# Patient Record
Sex: MALE | Race: WHITE | NOT HISPANIC OR LATINO | Employment: FULL TIME | URBAN - NONMETROPOLITAN AREA
[De-identification: names, ages, dates, MRNs, and addresses within clinical notes are randomized per-mention and may not be internally consistent; named-entity substitution may affect disease eponyms.]

---

## 2019-06-05 ENCOUNTER — HOSPITAL ENCOUNTER (EMERGENCY)
Facility: HOSPITAL | Age: 24
Discharge: PSYCHIATRIC OR SUBSTANCE USE FACILITY | End: 2019-06-06
Attending: EMERGENCY MEDICINE

## 2019-06-05 VITALS
HEART RATE: 110 BPM | DIASTOLIC BLOOD PRESSURE: 80 MMHG | OXYGEN SATURATION: 97 % | RESPIRATION RATE: 16 BRPM | HEIGHT: 65 IN | SYSTOLIC BLOOD PRESSURE: 135 MMHG | BODY MASS INDEX: 25.33 KG/M2 | TEMPERATURE: 98.1 F | WEIGHT: 152 LBS

## 2019-06-05 DIAGNOSIS — R31.9 HEMATURIA, UNSPECIFIED TYPE: ICD-10-CM

## 2019-06-05 DIAGNOSIS — R45.851 SUICIDAL IDEATION: Primary | ICD-10-CM

## 2019-06-06 ENCOUNTER — HOSPITAL ENCOUNTER (INPATIENT)
Facility: HOSPITAL | Age: 24
LOS: 4 days | Discharge: HOME | End: 2019-06-10
Attending: PSYCHIATRY & NEUROLOGY | Admitting: PSYCHIATRY & NEUROLOGY

## 2019-06-06 PROBLEM — R41.83 BORDERLINE INTELLECTUAL FUNCTIONING: Status: ACTIVE | Noted: 2019-06-06

## 2019-06-06 PROBLEM — F63.9 IMPULSE CONTROL DISORDER: Status: ACTIVE | Noted: 2019-06-06

## 2019-06-06 PROBLEM — F33.2 SEVERE EPISODE OF RECURRENT MAJOR DEPRESSIVE DISORDER, WITHOUT PSYCHOTIC FEATURES: Status: ACTIVE | Noted: 2019-06-06

## 2019-06-06 PROBLEM — R45.851 SUICIDAL IDEATION: Status: ACTIVE | Noted: 2019-06-06

## 2019-06-06 LAB
ALBUMIN SERPL-MCNC: 4.5 G/DL (ref 3.5–5.2)
ALBUMIN/GLOB SERPL: 1.3 G/DL
ALP SERPL-CCNC: 101 U/L (ref 39–117)
ALT SERPL W P-5'-P-CCNC: 25 U/L (ref 1–41)
AMPHET+METHAMPHET UR QL: NEGATIVE
ANION GAP SERPL CALCULATED.3IONS-SCNC: 12 MMOL/L
APAP SERPL-MCNC: <5 MCG/ML (ref 10–30)
AST SERPL-CCNC: 16 U/L (ref 1–40)
BACTERIA UR QL AUTO: ABNORMAL /HPF
BARBITURATES UR QL SCN: NEGATIVE
BASOPHILS # BLD AUTO: 0.05 10*3/MM3 (ref 0–0.2)
BASOPHILS NFR BLD AUTO: 0.4 % (ref 0–1.5)
BENZODIAZ UR QL SCN: NEGATIVE
BILIRUB SERPL-MCNC: 0.8 MG/DL (ref 0.2–1.2)
BILIRUB UR QL STRIP: ABNORMAL
BUN BLD-MCNC: 10 MG/DL (ref 6–20)
BUN/CREAT SERPL: 12.3 (ref 7–25)
CALCIUM SPEC-SCNC: 9.6 MG/DL (ref 8.6–10.5)
CANNABINOIDS SERPL QL: NEGATIVE
CHLORIDE SERPL-SCNC: 104 MMOL/L (ref 98–107)
CHOLEST SERPL-MCNC: 170 MG/DL (ref 0–200)
CLARITY UR: CLEAR
CO2 SERPL-SCNC: 27 MMOL/L (ref 22–29)
COCAINE UR QL: NEGATIVE
COLOR UR: YELLOW
CREAT BLD-MCNC: 0.81 MG/DL (ref 0.76–1.27)
DEPRECATED RDW RBC AUTO: 37.1 FL (ref 37–54)
EOSINOPHIL # BLD AUTO: 0.32 10*3/MM3 (ref 0–0.4)
EOSINOPHIL NFR BLD AUTO: 2.5 % (ref 0.3–6.2)
ERYTHROCYTE [DISTWIDTH] IN BLOOD BY AUTOMATED COUNT: 12.4 % (ref 12.3–15.4)
ETHANOL BLD-MCNC: <10 MG/DL (ref 0–10)
ETHANOL UR QL: <0.01 %
GFR SERPL CREATININE-BSD FRML MDRD: 119 ML/MIN/1.73
GLOBULIN UR ELPH-MCNC: 3.5 GM/DL
GLUCOSE BLD-MCNC: 113 MG/DL (ref 65–99)
GLUCOSE P FAST SERPL-MCNC: 99 MG/DL (ref 72–112)
GLUCOSE UR STRIP-MCNC: NEGATIVE MG/DL
HCT VFR BLD AUTO: 46.5 % (ref 37.5–51)
HDLC SERPL-MCNC: 36 MG/DL (ref 40–60)
HGB BLD-MCNC: 15.8 G/DL (ref 13–17.7)
HGB UR QL STRIP.AUTO: ABNORMAL
HOLD SPECIMEN: NORMAL
HOLD SPECIMEN: NORMAL
HYALINE CASTS UR QL AUTO: ABNORMAL /LPF
IMM GRANULOCYTES # BLD AUTO: 0.06 10*3/MM3 (ref 0–0.05)
IMM GRANULOCYTES NFR BLD AUTO: 0.5 % (ref 0–0.5)
KETONES UR QL STRIP: NEGATIVE
LDLC SERPL CALC-MCNC: 119 MG/DL (ref 0–100)
LDLC/HDLC SERPL: 3.32 {RATIO}
LEUKOCYTE ESTERASE UR QL STRIP.AUTO: NEGATIVE
LYMPHOCYTES # BLD AUTO: 4.83 10*3/MM3 (ref 0.7–3.1)
LYMPHOCYTES NFR BLD AUTO: 37.6 % (ref 19.6–45.3)
MCH RBC QN AUTO: 27.8 PG (ref 26.6–33)
MCHC RBC AUTO-ENTMCNC: 34 G/DL (ref 31.5–35.7)
MCV RBC AUTO: 81.9 FL (ref 79–97)
METHADONE UR QL SCN: NEGATIVE
MONOCYTES # BLD AUTO: 0.89 10*3/MM3 (ref 0.1–0.9)
MONOCYTES NFR BLD AUTO: 6.9 % (ref 5–12)
NEUTROPHILS # BLD AUTO: 6.68 10*3/MM3 (ref 1.7–7)
NEUTROPHILS NFR BLD AUTO: 52.1 % (ref 42.7–76)
NITRITE UR QL STRIP: NEGATIVE
NRBC BLD AUTO-RTO: 0 /100 WBC (ref 0–0.2)
OPIATES UR QL: NEGATIVE
OXYCODONE UR QL SCN: NEGATIVE
PH UR STRIP.AUTO: 7 [PH] (ref 5–9)
PLAT MORPH BLD: NORMAL
PLATELET # BLD AUTO: 184 10*3/MM3 (ref 140–450)
PMV BLD AUTO: 13 FL (ref 6–12)
POTASSIUM BLD-SCNC: 3.8 MMOL/L (ref 3.5–5.2)
PROT SERPL-MCNC: 8 G/DL (ref 6–8.5)
PROT UR QL STRIP: NEGATIVE
RBC # BLD AUTO: 5.68 10*6/MM3 (ref 4.14–5.8)
RBC # UR: ABNORMAL /HPF
RBC MORPH BLD: NORMAL
REF LAB TEST METHOD: ABNORMAL
SALICYLATES SERPL-MCNC: <0.3 MG/DL
SODIUM BLD-SCNC: 143 MMOL/L (ref 136–145)
SP GR UR STRIP: 1.02 (ref 1–1.03)
SQUAMOUS #/AREA URNS HPF: ABNORMAL /HPF
TRIGL SERPL-MCNC: 73 MG/DL (ref 0–150)
UROBILINOGEN UR QL STRIP: ABNORMAL
VLDLC SERPL-MCNC: 14.6 MG/DL
WBC MORPH BLD: NORMAL
WBC NRBC COR # BLD: 12.83 10*3/MM3 (ref 3.4–10.8)
WBC UR QL AUTO: ABNORMAL /HPF
WHOLE BLOOD HOLD SPECIMEN: NORMAL
WHOLE BLOOD HOLD SPECIMEN: NORMAL

## 2019-06-06 PROCEDURE — 80307 DRUG TEST PRSMV CHEM ANLYZR: CPT

## 2019-06-06 PROCEDURE — 93005 ELECTROCARDIOGRAM TRACING: CPT | Performed by: PSYCHIATRY & NEUROLOGY

## 2019-06-06 PROCEDURE — 82306 VITAMIN D 25 HYDROXY: CPT | Performed by: PSYCHIATRY & NEUROLOGY

## 2019-06-06 PROCEDURE — 90791 PSYCH DIAGNOSTIC EVALUATION: CPT | Performed by: PSYCHIATRY & NEUROLOGY

## 2019-06-06 PROCEDURE — 85007 BL SMEAR W/DIFF WBC COUNT: CPT | Performed by: EMERGENCY MEDICINE

## 2019-06-06 PROCEDURE — 82947 ASSAY GLUCOSE BLOOD QUANT: CPT | Performed by: PSYCHIATRY & NEUROLOGY

## 2019-06-06 PROCEDURE — 81001 URINALYSIS AUTO W/SCOPE: CPT

## 2019-06-06 PROCEDURE — 82607 VITAMIN B-12: CPT | Performed by: PSYCHIATRY & NEUROLOGY

## 2019-06-06 PROCEDURE — 80053 COMPREHEN METABOLIC PANEL: CPT

## 2019-06-06 PROCEDURE — 80061 LIPID PANEL: CPT | Performed by: PSYCHIATRY & NEUROLOGY

## 2019-06-06 PROCEDURE — 93010 ELECTROCARDIOGRAM REPORT: CPT | Performed by: INTERNAL MEDICINE

## 2019-06-06 PROCEDURE — 85025 COMPLETE CBC W/AUTO DIFF WBC: CPT

## 2019-06-06 PROCEDURE — 99222 1ST HOSP IP/OBS MODERATE 55: CPT | Performed by: FAMILY MEDICINE

## 2019-06-06 RX ORDER — LOPERAMIDE HYDROCHLORIDE 2 MG/1
2 CAPSULE ORAL
Status: CANCELLED | OUTPATIENT
Start: 2019-06-06

## 2019-06-06 RX ORDER — HYDROXYZINE PAMOATE 50 MG/1
50 CAPSULE ORAL EVERY 6 HOURS PRN
Status: DISCONTINUED | OUTPATIENT
Start: 2019-06-06 | End: 2019-06-10 | Stop reason: HOSPADM

## 2019-06-06 RX ORDER — TRAZODONE HYDROCHLORIDE 50 MG/1
50 TABLET ORAL NIGHTLY PRN
Status: CANCELLED | OUTPATIENT
Start: 2019-06-06

## 2019-06-06 RX ORDER — ACETAMINOPHEN 325 MG/1
650 TABLET ORAL EVERY 4 HOURS PRN
Status: CANCELLED | OUTPATIENT
Start: 2019-06-06

## 2019-06-06 RX ORDER — VENLAFAXINE HYDROCHLORIDE 75 MG/1
150 CAPSULE, EXTENDED RELEASE ORAL
Status: COMPLETED | OUTPATIENT
Start: 2019-06-09 | End: 2019-06-09

## 2019-06-06 RX ORDER — LOPERAMIDE HYDROCHLORIDE 2 MG/1
2 CAPSULE ORAL
Status: DISCONTINUED | OUTPATIENT
Start: 2019-06-06 | End: 2019-06-10 | Stop reason: HOSPADM

## 2019-06-06 RX ORDER — CLONIDINE HYDROCHLORIDE 0.1 MG/1
0.1 TABLET ORAL NIGHTLY
Status: DISCONTINUED | OUTPATIENT
Start: 2019-06-06 | End: 2019-06-08

## 2019-06-06 RX ORDER — HYDROXYZINE PAMOATE 25 MG/1
50 CAPSULE ORAL EVERY 6 HOURS PRN
Status: CANCELLED | OUTPATIENT
Start: 2019-06-06

## 2019-06-06 RX ORDER — CLONIDINE HYDROCHLORIDE 0.1 MG/1
0.1 TABLET ORAL EVERY 4 HOURS PRN
Status: DISCONTINUED | OUTPATIENT
Start: 2019-06-06 | End: 2019-06-10 | Stop reason: HOSPADM

## 2019-06-06 RX ORDER — TRAZODONE HYDROCHLORIDE 50 MG/1
50 TABLET ORAL NIGHTLY PRN
Status: DISCONTINUED | OUTPATIENT
Start: 2019-06-06 | End: 2019-06-10 | Stop reason: HOSPADM

## 2019-06-06 RX ORDER — CLONIDINE HYDROCHLORIDE 0.1 MG/1
0.1 TABLET ORAL EVERY 4 HOURS PRN
Status: CANCELLED | OUTPATIENT
Start: 2019-06-06

## 2019-06-06 RX ORDER — VENLAFAXINE HYDROCHLORIDE 75 MG/1
75 CAPSULE, EXTENDED RELEASE ORAL
Status: COMPLETED | OUTPATIENT
Start: 2019-06-08 | End: 2019-06-08

## 2019-06-06 RX ORDER — VENLAFAXINE HYDROCHLORIDE 37.5 MG/1
37.5 CAPSULE, EXTENDED RELEASE ORAL
Status: COMPLETED | OUTPATIENT
Start: 2019-06-07 | End: 2019-06-07

## 2019-06-06 RX ORDER — ALUMINA, MAGNESIA, AND SIMETHICONE 2400; 2400; 240 MG/30ML; MG/30ML; MG/30ML
15 SUSPENSION ORAL EVERY 6 HOURS PRN
Status: CANCELLED | OUTPATIENT
Start: 2019-06-06

## 2019-06-06 RX ORDER — ACETAMINOPHEN 325 MG/1
650 TABLET ORAL EVERY 4 HOURS PRN
Status: DISCONTINUED | OUTPATIENT
Start: 2019-06-06 | End: 2019-06-10 | Stop reason: HOSPADM

## 2019-06-06 RX ORDER — ALUMINA, MAGNESIA, AND SIMETHICONE 2400; 2400; 240 MG/30ML; MG/30ML; MG/30ML
15 SUSPENSION ORAL EVERY 6 HOURS PRN
Status: DISCONTINUED | OUTPATIENT
Start: 2019-06-06 | End: 2019-06-10 | Stop reason: HOSPADM

## 2019-06-06 RX ADMIN — CLONIDINE HYDROCHLORIDE 0.1 MG: 0.1 TABLET ORAL at 21:21

## 2019-06-06 RX ADMIN — NICOTINE POLACRILEX 2 MG: 2 GUM, CHEWING BUCCAL at 21:07

## 2019-06-06 RX ADMIN — NICOTINE POLACRILEX 2 MG: 2 GUM, CHEWING BUCCAL at 14:36

## 2019-06-06 RX ADMIN — NICOTINE POLACRILEX 2 MG: 2 GUM, CHEWING BUCCAL at 17:19

## 2019-06-06 NOTE — ED NOTES
Pt has a history of SI and was brought into the ER by his cousin. Pt states he had thoughts about blowing his brains out and also is a cutter. Skin assessed and no open skin lacerations notited at this time. Pt has previously attempted suicide by hanging and cutting his own throat. Pt states that his girlfriend broke up with him and he is very depressed at this time.     Family wants updates about pt and pt gave permission to contact these individuals:  Aunt Belle- [phone]  Cousin Silvia Colin- [phone]     Addie Rodrigez, RN  06/06/19 0005

## 2019-06-06 NOTE — ED PROVIDER NOTES
"Subjective   23-year-old white male with history of depression and PTSD presents to the emergency department chief complaint of suicidal.  Patient relates he is depressed because his girlfriend broke up with him.  He expresses suicidal ideation.  Patient states \"if I had a gun I would blow my brains out,  If I had pills I would overdose.\"            Review of Systems   Constitutional: Negative for chills, diaphoresis and fever.   Respiratory: Negative for cough and shortness of breath.    Cardiovascular: Negative for chest pain.   Gastrointestinal: Negative for abdominal pain, nausea and vomiting.   Genitourinary: Negative for dysuria, flank pain and hematuria.   Musculoskeletal: Negative for back pain, gait problem and neck pain.   Neurological: Negative for syncope, weakness and headaches.   Psychiatric/Behavioral: Positive for suicidal ideas. Negative for hallucinations.   All other systems reviewed and are negative.      History reviewed. No pertinent past medical history.    Allergies   Allergen Reactions   • Adderall [Amphetamine-Dextroamphetamine] Other (See Comments)     Pt states its redness all over    • Penicillins Other (See Comments)     Pt doesn't know the exact side affect    • Benadryl [Diphenhydramine Hcl (Sleep)] Rash       History reviewed. No pertinent surgical history.    Family History   Problem Relation Age of Onset   • Diabetes Mother    • No Known Problems Father        Social History     Socioeconomic History   • Marital status: Single     Spouse name: Not on file   • Number of children: Not on file   • Years of education: Not on file   • Highest education level: Not on file   Tobacco Use   • Smoking status: Current Every Day Smoker     Packs/day: 1.00     Types: Cigarettes   • Smokeless tobacco: Former User   Substance and Sexual Activity   • Alcohol use: No   • Drug use: No           Objective   Physical Exam   Constitutional: He is oriented to person, place, and time. He appears " well-developed and well-nourished. No distress.   HENT:   Head: Normocephalic and atraumatic.   Right Ear: External ear normal.   Left Ear: External ear normal.   Nose: Nose normal.   Mouth/Throat: Oropharynx is clear and moist.   Eyes: Conjunctivae and EOM are normal. Pupils are equal, round, and reactive to light.   Neck: Normal range of motion. Neck supple.   Cardiovascular: Normal rate, regular rhythm, normal heart sounds and intact distal pulses.   Pulmonary/Chest: Effort normal and breath sounds normal.   Abdominal: Soft. Bowel sounds are normal. He exhibits no distension and no mass. There is no tenderness. There is no guarding.   Musculoskeletal: Normal range of motion. He exhibits no edema or tenderness.   Neurological: He is alert and oriented to person, place, and time. No sensory deficit. He exhibits normal muscle tone.   Skin: Skin is warm and dry. He is not diaphoretic.   Psychiatric:   Depressed mood with suicidal ideation.  Judgment is impaired.   Nursing note and vitals reviewed.      Procedures           ED Course  ED Course as of Jun 06 0222   Thu Jun 06, 2019 0222 Patient has been accepted for admission to the behavioral health unit.  [DR]      ED Course User Index  [DR] Alfredo Aguilar MD      Labs Reviewed   COMPREHENSIVE METABOLIC PANEL - Abnormal; Notable for the following components:       Result Value    Glucose 113 (*)     All other components within normal limits    Narrative:     GFR Normal >60  Chronic Kidney Disease <60  Kidney Failure <15   ACETAMINOPHEN LEVEL - Abnormal; Notable for the following components:    Acetaminophen <5.0 (*)     All other components within normal limits   URINALYSIS W/ MICROSCOPIC IF INDICATED (NO CULTURE) - Abnormal; Notable for the following components:    Bilirubin, UA Small (1+) (*)     Blood, UA Small (1+) (*)     Urobilinogen, UA 2.0 E.U./dL (*)     All other components within normal limits   CBC WITH AUTO DIFFERENTIAL - Abnormal; Notable for the  following components:    WBC 12.83 (*)     MPV 13.0 (*)     Lymphocytes, Absolute 4.83 (*)     Immature Grans, Absolute 0.06 (*)     All other components within normal limits   URINALYSIS, MICROSCOPIC ONLY - Abnormal; Notable for the following components:    RBC, UA 21-30 (*)     All other components within normal limits   SALICYLATE LEVEL - Normal   URINE DRUG SCREEN - Normal    Narrative:     Negative Thresholds For Drugs Screened:     Amphetamines               500 ng/ml   Barbiturates               200 ng/ml   Benzodiazepines            100 ng/ml   Cocaine                    300 ng/ml   Methadone                  300 ng/ml   Opiates                    300 ng/ml   Oxycodone                  100 ng/ml   THC                        50 ng/ml    The Normal Value for all drugs tested is negative. This report includes final unconfirmed screening results to be used for medical treatment purposes only. Unconfirmed results must not be used for non-medical purposes such as employment or legal testing. Clinical consideration should be applied to any drug of abuse test, particulary when unconfirmed results are used.   SCAN SLIDE - Normal   RAINBOW DRAW    Narrative:     The following orders were created for panel order [place] Draw.  Procedure                               Abnormality         Status                     ---------                               -----------         ------                     Light Blue Top[[id number]]                                   Final result               Green Top (Gel)[[id number]]                                  Final result               Lavender Top[[id number]]                                     Final result               Gold Top - SST[[id number]]                                   Final result                 Please view results for these tests on the individual orders.   ETHANOL   CBC AND DIFFERENTIAL    Narrative:     The following orders were created for panel order CBC &  Differential.  Procedure                               Abnormality         Status                     ---------                               -----------         ------                     Scan Slide[[id number]]                   Normal              Final result               CBC Auto Differential[[id number]]        Abnormal            Final result                 Please view results for these tests on the individual orders.   LIGHT BLUE TOP   GREEN TOP   LAVENDER TOP   GOLD TOP - SST     No results found.            [facility]      Final diagnoses:   Suicidal ideation   Hematuria, unspecified type            Alfredo Aguilar MD  06/06/19 6052
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
## 2019-06-06 NOTE — CONSULTS
CHIEF COMPLAINT/REASON FOR VISIT:  Suicidal Ideation    HPI:  Patient presented to our ED with the above complaint on June 6 at around 1 AM.  He gave a history of depression and PTSD and presented with this suicidal ideation recently without a specific timeframe expressed.  This was precipitated by a break-up with his girlfriend and he said if he had a gun he would blow his brains out and if necessary take a pill for overdose.  There is a PCP note from May 7 of this year it has listed a seizure disorder and issue of guardianship.  The PCP wanted to do imaging and further evaluation that was declined coverage from the insurance company.  The patient reports that kidney problems and liver problems kept him from taking the antidepressant he was prescribed but the only abnormality from the lab that was done was a slight increase in bilirubin to 1.5.  This morning he moves and speaks extremely slowly and is cooperative.    PROBLEM LIST:  Patient Active Problem List    Diagnosis   • Suicidal ideation [R45.851]         CURRENT MEDICATIONS:  No medications prior to admission.       ALLERGIES:  Adderall [amphetamine-dextroamphetamine]; Penicillins; and Benadryl [diphenhydramine hcl (sleep)]      PAST MEDICAL/SURGICAL HISTORY:  From outpatient notes:  ADHD  PTSD  Seizure disorder  Mentally deficient  Fetal alcohol syndrome  Disabled  Depression  Cutting disorder    Only surgical history is oral surgery    Review of Systems   Constitutional: Negative for activity change, appetite change, fatigue and fever.   HENT: Negative for congestion, ear discharge, ear pain, facial swelling, hearing loss, nosebleeds, postnasal drip, rhinorrhea, sinus pressure, sore throat, tinnitus and trouble swallowing.    Eyes: Negative for pain, discharge and visual disturbance.   Respiratory: Negative for cough, shortness of breath and wheezing.    Cardiovascular: Negative for chest pain, palpitations and leg swelling.   Gastrointestinal: Negative  "for abdominal pain, blood in stool, constipation, diarrhea, nausea and vomiting.   Genitourinary: Negative for difficulty urinating, discharge, dysuria, flank pain, frequency, hematuria, penile pain, penile swelling, scrotal swelling, testicular pain and urgency.   Musculoskeletal: Negative for arthralgias, back pain, joint swelling, myalgias and neck pain.   Skin: Negative for rash and wound.   Neurological: Negative for dizziness, seizures, syncope, weakness, light-headedness and headaches.   Hematological: Negative for adenopathy.       Social History     Socioeconomic History   • Marital status: Single     Spouse name: Not on file   • Number of children: Not on file   • Years of education: Not on file   • Highest education level: Not on file   Tobacco Use   • Smoking status: Current Every Day Smoker     Packs/day: 1.00     Types: Cigarettes   • Smokeless tobacco: Former User   Substance and Sexual Activity   • Alcohol use: No   • Drug use: No       Family History   Problem Relation Age of Onset   • Diabetes Mother    • No Known Problems Father              Objective     /57 (BP Location: Left arm, Patient Position: Lying)   Pulse 72   Temp 98 °F (36.7 °C) (Oral)   Resp 18   Ht 165.1 cm (65\")   Wt 68.9 kg (152 lb 0 oz)   SpO2 97%   BMI 25.29 kg/m²     Physical Exam   Constitutional: He appears well-developed and well-nourished.   HENT:   Head: Normocephalic and atraumatic.   Eyes: Conjunctivae and EOM are normal.   Neck: Normal range of motion. Neck supple. No thyromegaly present.   Cardiovascular: Normal rate, regular rhythm and normal heart sounds. Exam reveals no gallop and no friction rub.   No murmur heard.  Pulmonary/Chest: Effort normal and breath sounds normal. No respiratory distress. He has no wheezes. He has no rales.   Abdominal: Soft. He exhibits no distension and no mass. There is no tenderness. There is no rebound and no guarding.   Musculoskeletal: Normal range of motion. "   Lymphadenopathy:     He has no cervical adenopathy.   Neurological: He is alert. He has normal strength. He displays no tremor and normal reflexes. No sensory deficit. He exhibits normal muscle tone. Coordination normal. He displays no Babinski's sign on the right side. He displays no Babinski's sign on the left side.   Reflex Scores:       Tricep reflexes are 2+ on the right side and 2+ on the left side.       Bicep reflexes are 2+ on the right side and 2+ on the left side.       Brachioradialis reflexes are 2+ on the right side and 2+ on the left side.       Patellar reflexes are 2+ on the right side and 2+ on the left side.       Achilles reflexes are 2+ on the right side and 2+ on the left side.  CN II: Visual fields intact  CN III,IV,VI: extraocular movements intact  CN V: Masseter strength and sensation in all three divisions intact  CN VII: Smile and eyelid closure symmetrical  CN VIII: Hearing intact  CN IX and X: Voice and palate movement intact  CN XI: Shoulder shrug intact  CN XII: Tongue protrusion and movement intact    Patient responds very slowly with single word answers.   Skin: Skin is warm and dry. No rash noted. No erythema.   On the right anterior forehead is a raised slightly pigmented lesion about 2 x 4 mm with no unusual erythema or exudate.   Nursing note and vitals reviewed.      Dystonia/Tardive Dyskinesia  Absent  Meningeal Signs  Absent    Diagnostic Studies  CBC, CMP,TSH, UDS, acetaminophen level, salicylate level, ethanol level, U/A all normal except  COMPREHENSIVE METABOLIC PANEL - Abnormal; Notable for the following components:       Result Value      Glucose 113 (*)       All other components within normal limits     Narrative:      GFR Normal >60  Chronic Kidney Disease <60  Kidney Failure <15   ACETAMINOPHEN LEVEL - Abnormal; Notable for the following components:     Acetaminophen <5.0 (*)       All other components within normal limits   URINALYSIS W/ MICROSCOPIC IF INDICATED  (NO CULTURE) - Abnormal; Notable for the following components:     Bilirubin, UA Small (1+) (*)       Blood, UA Small (1+) (*)       Urobilinogen, UA 2.0 E.U./dL (*)       All other components within normal limits   CBC WITH AUTO DIFFERENTIAL - Abnormal; Notable for the following components:     WBC 12.83 (*)       MPV 13.0 (*)       Lymphocytes, Absolute 4.83 (*)       Immature Grans, Absolute 0.06 (*)       All other components within normal limits   URINALYSIS, MICROSCOPIC ONLY - Abnormal; Notable for the following components:     RBC, UA 21-30 (*)       All other components within normal limits   SALICYLATE LEVEL - Normal   URINE DRUG SCREEN - Normal   Ethanol less than 10    EKG done on June 6 at almost 7 AM shows:  Vent. Rate : 069 BPM     Atrial Rate : 069 BPM     P-R Int : 136 ms          QRS Dur : 100 ms      QT Int : 382 ms       P-R-T Axes : 030 075 052 degrees     QTc Int : 409 ms    Normal sinus rhythm with sinus arrhythmia  Normal ECG  No previous ECGs available      Assessment/Plan     From outpatient notes:    ADHD  PTSD  Seizure disorder  Mentally deficient  Fetal alcohol syndrome  Disabled  Depression  Cutting disorder    The hematuria is apparently chronic from the outpatient notes and suggest no further evaluation at this time.    Hyperglycemia, mild.  With fasting of 99 this morning suggest no further evaluation.          Continue Home Meds as ordered. Mental health and pain issues managed per psychiatry.  Further diagnostic studies or intervention based on hospital course.
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
## 2019-06-06 NOTE — PLAN OF CARE
Problem: Patient Care Overview  Goal: Plan of Care Review  Outcome: Ongoing (interventions implemented as appropriate)   06/06/19 0312   Coping/Psychosocial   Plan of Care Reviewed With patient   Coping/Psychosocial   Patient Agreement with Plan of Care agrees   Plan of Care Review   Progress no change     Goal: Individualization and Mutuality  Outcome: Ongoing (interventions implemented as appropriate)    Goal: Discharge Needs Assessment  Outcome: Ongoing (interventions implemented as appropriate)    Goal: Interprofessional Rounds/Family Conf  Outcome: Ongoing (interventions implemented as appropriate)

## 2019-06-06 NOTE — NURSING NOTE
Patient arrived at [facility] via security and ED Tech. Wanded for contraband. Patient oriented to unit and all consents signed. Patient placed in paper scrubs. Offered snack. Will continue to monitor

## 2019-06-06 NOTE — NURSING NOTE
"Review of Current Symptoms--only current symptoms        · General   NONE    · Eyes    None     · ENT/Mouth    None    · Cardio    None    · Resp    None    · GI     None    ·     None    · MS    None    · Skin/Hair/Nails    None    · Neuro    None       Patient states he can't take ANY type of medication because his \"kidney's and liver are screwed up.\" although he is unable to give a diagnosis or name of condition.     "

## 2019-06-06 NOTE — PLAN OF CARE
Problem: Overarching Goals (Adult)  Goal: Adheres to Safety Considerations for Self and Others  Outcome: Ongoing (interventions implemented as appropriate)   06/06/19 1034   Overarching Goals (Adult)   Adheres to Safety Considerations for Self and Others making progress toward outcome     Goal: Optimized Coping Skills in Response to Life Stressors  Outcome: Ongoing (interventions implemented as appropriate)   06/06/19 1034   Overarching Goals (Adult)   Optimized Coping Skills in Response to Life Stressors making progress toward outcome     Goal: Develops/Participates in Therapeutic [place] to Support Successful Transition  Outcome: Ongoing (interventions implemented as appropriate)   06/06/19 1034   Overarching Goals (Adult)   Develops/Participates in Therapeutic [place] to Support Successful Transition making progress toward outcome       Problem: Suicidal Behavior (Adult)  Goal: Suicidal Behavior is Absent/Minimized/Managed  Outcome: Ongoing (interventions implemented as appropriate)   06/06/19 1034   Suicidal Behavior is Absent/Minimized/Managed   Suicidal Behavior Managed/Minimized Action Step (STG) Outcome making progress toward outcome       Problem: Ineffective Coping  Goal: Patient will demonstrate the ability to initiate new constructive life skills consistently.  Outcome: Ongoing (interventions implemented as appropriate)
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
## 2019-06-07 LAB
25(OH)D3 SERPL-MCNC: 30.8 NG/ML (ref 30–100)
TSH SERPL DL<=0.05 MIU/L-ACNC: 1.21 MIU/ML (ref 0.27–4.2)
VIT B12 BLD-MCNC: 279 PG/ML (ref 211–946)

## 2019-06-07 PROCEDURE — 90833 PSYTX W PT W E/M 30 MIN: CPT | Performed by: NURSE PRACTITIONER

## 2019-06-07 PROCEDURE — 99232 SBSQ HOSP IP/OBS MODERATE 35: CPT | Performed by: NURSE PRACTITIONER

## 2019-06-07 PROCEDURE — 84443 ASSAY THYROID STIM HORMONE: CPT | Performed by: PSYCHIATRY & NEUROLOGY

## 2019-06-07 RX ADMIN — VENLAFAXINE HYDROCHLORIDE 37.5 MG: 37.5 CAPSULE, EXTENDED RELEASE ORAL at 09:00

## 2019-06-07 RX ADMIN — TRAZODONE HYDROCHLORIDE 50 MG: 50 TABLET ORAL at 23:23
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
## 2019-06-07 NOTE — PLAN OF CARE
Problem: Patient Care Overview  Goal: Plan of Care Review  Outcome: Ongoing (interventions implemented as appropriate)      Problem: Overarching Goals (Adult)  Goal: Adheres to Safety Considerations for Self and Others  Outcome: Ongoing (interventions implemented as appropriate)    Goal: Optimized Coping Skills in Response to Life Stressors  Outcome: Ongoing (interventions implemented as appropriate)    Goal: Develops/Participates in Therapeutic [place] to Support Successful Transition  Outcome: Ongoing (interventions implemented as appropriate)      Problem: Suicidal Behavior (Adult)  Goal: Suicidal Behavior is Absent/Minimized/Managed  Outcome: Ongoing (interventions implemented as appropriate)      Problem: Depression  Goal: Patient will demonstrate the ability to initiate new constructive life skills outside of sessions on a consistent basis.  Outcome: Ongoing (interventions implemented as appropriate)      Problem: Ineffective Coping  Goal: Patient will demonstrate the ability to initiate new constructive life skills consistently.  Outcome: Ongoing (interventions implemented as appropriate)

## 2019-06-07 NOTE — PLAN OF CARE
Problem: Patient Care Overview  Goal: Plan of Care Review  Outcome: Ongoing (interventions implemented as appropriate)   06/07/19 0949   Coping/Psychosocial   Plan of Care Reviewed With patient   Coping/Psychosocial   Patient Agreement with Plan of Care agrees   Plan of Care Review   Progress no change     Goal: Individualization and Mutuality  Outcome: Ongoing (interventions implemented as appropriate)    Goal: Discharge Needs Assessment  Outcome: Ongoing (interventions implemented as appropriate)    Goal: Interprofessional Rounds/Family Conf  Outcome: Ongoing (interventions implemented as appropriate)      Problem: Overarching Goals (Adult)  Goal: Adheres to Safety Considerations for Self and Others  Outcome: Ongoing (interventions implemented as appropriate)    Goal: Optimized Coping Skills in Response to Life Stressors  Outcome: Ongoing (interventions implemented as appropriate)    Goal: Develops/Participates in Therapeutic [place] to Support Successful Transition  Outcome: Ongoing (interventions implemented as appropriate)      Problem: Suicidal Behavior (Adult)  Goal: Suicidal Behavior is Absent/Minimized/Managed  Outcome: Ongoing (interventions implemented as appropriate)      Problem: Depression  Goal: Patient will demonstrate the ability to initiate new constructive life skills outside of sessions on a consistent basis.  Outcome: Ongoing (interventions implemented as appropriate)      Problem: Ineffective Coping  Goal: Patient will demonstrate the ability to initiate new constructive life skills consistently.  Outcome: Ongoing (interventions implemented as appropriate)

## 2019-06-07 NOTE — NURSING NOTE
"Behavior   Note any precipitants to event or behavior   Describe level and action of any aggressive behavior or speech and associated interventions.     Anxiety: Patient denies at this time  Depression: Patient denies at this time  Pain  0  AVH   no  S/I   no  Plan  no  H/I   no  Plan  no    Affect   euthymic/normal      Note: Patient resting in his room, he is alert, clean, dressed appropriately for the weather with normal affect and clear, spontaneous speech. He is calm and cooperative with staff.He states he is feeling \"really good\" this morning and denies any S/I, H/I, depression or anxiety. He was concerned that his medication may cause him to \"fall out with a seizure\".  Medication education provided and medication was taken. He agrees to notify staff of any concerns.      Intervention    PRN medication utilized:  no    Instructed in medication usage and effects  Medications administered as ordered  Encouraged to verbalize needs      Response    Verbalized understanding   Did patient take medications as ordered yes   Did patient interact with assessment?  yes     Plan    Will monitor for safety  Will monitor every 15 minutes as ordered  Will evaluate and promote the plan of care    Last BM:    (Please chart in I/O as well)    "

## 2019-06-07 NOTE — PROGRESS NOTES
"6/7/2019    Chief Complaint: suicidal ideation    Subjective:    Mr. Tyrel Gonsalez is a 23 yr old male that is admitted on the U. Today he is seen in treatment team and individually. He reports that he is feeling better but still unsure of how it will be when he goes home.   Tyrel reports being skeptical of the medication and would like to stay on the lower doses to prevent any adverse reactions, he reports he was given many medications at a different facility and it made him \"zombie\"    Tyrel is talking fast and appears to be hyperactive this morning, he is interacting with others and attending groups.   No AE to the medications thus far. He reports that he slept well last night.     Objective     Vital Signs    Temp:  [97.9 °F (36.6 °C)] 97.9 °F (36.6 °C)  Heart Rate:  [74-88] 74  Resp:  [18] 18  BP: ()/(52-55) 93/52    Physical Exam:   General Appearance: alert, appears stated age and cooperative,  Hygiene:   good  Gait & Station: Normal  Musculoskeletal: No tremors or abnormal involuntary movements    Mental Status Exam:   Cooperation:  Cooperative  Eye Contact:  Fair  Psychomotor Behavior:  Hyperactive  Mood: \"Fine\" and Improving  Affect:  normal  Speech:  Normal  Thought Process:  Coherent  Associations: Goal Directed  Thought Content:     Normal   Suicidal:  None   Homicidal:  None   Hallucinations:  None   Delusion:  None  Cognitive Functioning:   Consciousness: awake and alert  Reliability:  fair  Insight:  Fair  Judgement:  Impaired  Impulse Control:  Impaired    Lab Results (last 24 hours)     Procedure Component Value Units Date/Time    TSH [[id number]]  (Normal) Collected:  06/07/19 0713    Specimen:  Blood Updated:  06/07/19 0757     TSH 1.210 mIU/mL     Vitamin B12 [[id number]]  (Normal) Collected:  06/06/19 0008    Specimen:  Blood Updated:  06/07/19 0121     Vitamin B-12 279 pg/mL     Vitamin D 25 Hydroxy [[id number]]  (Normal) Collected:  06/06/19 0008    Specimen:  Blood Updated:  06/07/19 " 0121     25 Hydroxy, Vitamin D 30.8 ng/ml     Narrative:       Reference Range for Total Vitamin D 25(OH)     Deficiency <20.0 ng/mL   Insufficiency 21-29 ng/mL   Sufficiency  ng/mL  Toxicity >100 ng/ml        Imaging Results (last 24 hours)     ** No results found for the last 24 hours. **          Medicine:   Current Facility-Administered Medications:   •  acetaminophen (TYLENOL) tablet 650 mg, 650 mg, Oral, Q4H PRN, Kaiser Ovalle MD  •  aluminum-magnesium hydroxide-simethicone (MAALOX MAX) 400-400-40 MG/5ML suspension 15 mL, 15 mL, Oral, Q6H PRN, Kaiser Ovalle MD  •  CloNIDine (CATAPRES) tablet 0.1 mg, 0.1 mg, Oral, Q4H PRN, Kaiser Ovalle MD  •  CloNIDine (CATAPRES) tablet 0.1 mg, 0.1 mg, Oral, Nightly, Kaiser Ovalle MD, 0.1 mg at 06/06/19 2121  •  hydrOXYzine pamoate (VISTARIL) capsule 50 mg, 50 mg, Oral, Q6H PRN, Kaiser Ovalle MD  •  loperamide (IMODIUM) capsule 2 mg, 2 mg, Oral, Q2H PRN, Kaiser Ovalle MD  •  magnesium hydroxide (MILK OF MAGNESIA) suspension 2400 mg/10mL 10 mL, 10 mL, Oral, Daily PRN, Kaiser Ovalle MD  •  nicotine polacrilex (NICORETTE) gum 2 mg, 2 mg, Mouth/Throat, Q1H PRN, Kaiser Ovalle MD, 2 mg at 06/06/19 2107  •  traZODone (DESYREL) tablet 50 mg, 50 mg, Oral, Nightly PRN, Kaiser Ovalle MD  •  [COMPLETED] venlafaxine XR (EFFEXOR-XR) 24 hr capsule 37.5 mg, 37.5 mg, Oral, Daily With Breakfast, 37.5 mg at 06/07/19 0900 **FOLLOWED BY** [START ON 6/8/2019] venlafaxine XR (EFFEXOR-XR) 24 hr capsule 75 mg, 75 mg, Oral, Daily With Breakfast **FOLLOWED BY** [START ON 6/9/2019] venlafaxine XR (EFFEXOR-XR) 24 hr capsule 150 mg, 150 mg, Oral, Daily With Breakfast, Kaiser Ovalle MD    Diagnoses/Assessment:     Severe episode of recurrent major depressive disorder, without psychotic features (CMS/HCC)    Suicidal ideation    Impulse control disorder    Borderline intellectual functioning      Treatment Plan:    1) Will continue care for  the patient on the behavioral health unit at [facility] to ensure patient safety.  2) Will continue to provide treatment with the unit milieu, activities, therapies and psychopharmacological management.  3) Patient to be placed on or continued on  Q15 minute checks  and Suicide precautions.  4) Pertinent medical issues:none  5) Will order following labs: none  6) Will make the following medication changes:   --Cont Effexor XR 37.5mg (slow titration per request)  --Clonodine 0.1mg at night   7) Will continue discharge planning as appropriate for patient.  8) Psychotherapy provided for 16-37 minutes.  Provided supportive therapy and insight-oriented therapy.    Treatment plan and medication risks and benefits discussed with: Patient    Mirta E Jessie, KE  06/07/19  12:55 PM      Psychotherapy Note:   Total Psychotherapy Time: 22 minutes  Participants: Myself and Tyrel  Current Clinical Status: cooperative, increased anxiety  Focus of Therapeutic Encounter: Past behavior and coping strategies  Intervention Type: Insight-oriented therapy, supportive therapy  Therapy Notes: Reflective listening, supportive therapy, talked about new coping skills and problems within his relationships.   DX: Major Depressive Disorder  Plan: Continue to build and work on coping strategies   Post Therapy Status: cooperative, some improved anxiety

## 2019-06-07 NOTE — NURSING NOTE
Behavior   Note any precipitants to event or behavior   Describe level and action of any aggressive behavior or speech and associated interventions.     Anxiety: Patient denies at this time  Depression: Patient denies at this time  Pain  0  AVH   no  S/I   no  Plan  no  H/I   no  Plan  no    Affect   euthymic/normal      Note: Patient in common area, talking, laughing and playing games with peers. He denies anxiety, depression, and SI. He took all medications as ordered. No S/S of distress. Will continue to monitor.       Intervention    PRN medication utilized:  no    Instructed in medication usage and effects  Medications administered as ordered  Encouraged to verbalize needs      Response    Verbalized understanding   Did patient take medications as ordered yes   Did patient interact with assessment?  yes     Plan    Will monitor for safety  Will monitor every 15 minutes as ordered  Will evaluate and promote the plan of care    Last BM:  unknown date  (Please chart in I/O as well)

## 2019-06-07 NOTE — PROGRESS NOTES
Met with patient and completed Recreation Therapy Assessment.  Patient states that he used to hunt.  He states that he now enjoys fishing and spending time with family.   He states that he exercises to cope with stress.  Patient states that he would like to go back to school and states that he is motivated to get his life together.  Patient will be encouraged to attend groups and improve coping skills.

## 2019-06-07 NOTE — PLAN OF CARE
Problem: Overarching Goals (Adult)  Goal: Optimized Coping Skills in Response to Life Stressors    Intervention: Promote Effective Coping Strategies   06/07/19 1776   Coping/Psychosocial Interventions   Supportive Measures positive reinforcement provided;problem solving facilitated;decision-making supported;self-responsibility promoted

## 2019-06-07 NOTE — PLAN OF CARE
Problem: Patient Care Overview  Goal: Plan of Care Review  Outcome: Ongoing (interventions implemented as appropriate)   06/06/19 2247   Coping/Psychosocial   Plan of Care Reviewed With patient   Coping/Psychosocial   Patient Agreement with Plan of Care agrees   Plan of Care Review   Progress no change     Goal: Individualization and Mutuality  Outcome: Ongoing (interventions implemented as appropriate)    Goal: Discharge Needs Assessment  Outcome: Ongoing (interventions implemented as appropriate)    Goal: Interprofessional Rounds/Family Conf  Outcome: Ongoing (interventions implemented as appropriate)      Problem: Overarching Goals (Adult)  Goal: Adheres to Safety Considerations for Self and Others  Outcome: Ongoing (interventions implemented as appropriate)   06/06/19 2247   Overarching Goals (Adult)   Adheres to Safety Considerations for Self and Others making progress toward outcome     Goal: Optimized Coping Skills in Response to Life Stressors  Outcome: Ongoing (interventions implemented as appropriate)   06/06/19 2247   Overarching Goals (Adult)   Optimized Coping Skills in Response to Life Stressors making progress toward outcome     Goal: Develops/Participates in Therapeutic [place] to Support Successful Transition  Outcome: Ongoing (interventions implemented as appropriate)   06/06/19 2247   Overarching Goals (Adult)   Develops/Participates in Therapeutic [place] to Support Successful Transition making progress toward outcome       Problem: Suicidal Behavior (Adult)  Goal: Suicidal Behavior is Absent/Minimized/Managed  Outcome: Ongoing (interventions implemented as appropriate)   06/06/19 2247   Suicidal Behavior is Absent/Minimized/Managed   Suicidal Behavior Managed/Minimized Action Step (STG) Outcome making progress toward outcome       Problem: Depression  Goal: Patient will demonstrate the ability to initiate new constructive life skills outside of sessions on a consistent basis.  Outcome: Ongoing  (interventions implemented as appropriate)      Problem: Ineffective Coping  Goal: Patient will demonstrate the ability to initiate new constructive life skills consistently.  Outcome: Ongoing (interventions implemented as appropriate)

## 2019-06-08 PROCEDURE — 99232 SBSQ HOSP IP/OBS MODERATE 35: CPT | Performed by: NURSE PRACTITIONER

## 2019-06-08 RX ORDER — GUANFACINE 1 MG/1
1 TABLET ORAL NIGHTLY
Status: DISCONTINUED | OUTPATIENT
Start: 2019-06-08 | End: 2019-06-10 | Stop reason: HOSPADM

## 2019-06-08 RX ADMIN — VENLAFAXINE HYDROCHLORIDE 75 MG: 75 CAPSULE, EXTENDED RELEASE ORAL at 08:49

## 2019-06-08 RX ADMIN — TRAZODONE HYDROCHLORIDE 50 MG: 50 TABLET ORAL at 23:37

## 2019-06-08 RX ADMIN — GUANFACINE 1 MG: 1 TABLET ORAL at 21:31

## 2019-06-08 NOTE — PLAN OF CARE
Problem: Patient Care Overview  Goal: Plan of Care Review  Outcome: Ongoing (interventions implemented as appropriate)    Goal: Individualization and Mutuality  Outcome: Ongoing (interventions implemented as appropriate)    Goal: Discharge Needs Assessment  Outcome: Ongoing (interventions implemented as appropriate)    Goal: Interprofessional Rounds/Family Conf  Outcome: Ongoing (interventions implemented as appropriate)      Problem: Overarching Goals (Adult)  Goal: Adheres to Safety Considerations for Self and Others  Outcome: Ongoing (interventions implemented as appropriate)    Goal: Optimized Coping Skills in Response to Life Stressors  Outcome: Ongoing (interventions implemented as appropriate)    Goal: Develops/Participates in Therapeutic [place] to Support Successful Transition  Outcome: Ongoing (interventions implemented as appropriate)

## 2019-06-08 NOTE — NURSING NOTE
"Behavior   Note any precipitants to event or behavior   Describe level and action of any aggressive behavior or speech and associated interventions.     Anxiety: Excess Worry and Restless/Edgy  Depression: Patient denies at this time  Pain  0  AVH   no  S/I   no  Plan  no  H/I   no  Plan  no    Affect   euthymic/normal      Note:pt seen in lounge area, interactive with peers, had snack, can be loud and attention seeking, speaking rapidly, loudly.  Pt appears anxious.  Pt denies anxiety, however, speaks rapidly and becomes more loud when talking about his history, about his mother, previous mental health placements, and fear of medication side effects.  Pt states several times,\"now dont go telling the doctor I dont want my medications, because I do and im taking them.  I just worry that im gonna flop in the floor or have a seizure, and my mom used to make me take so many medications, they made me hyper, made me have seizures, like foaming at the mouth and shaking, and I just dont like to take them, but if they help then I will.\"  Pt repeated this train of thought several times, and took great reassurance and education to calm pt.  Pt denies any SI/HI, denies AVH, denies pain, depression, and is encouraged to report any perceived or actual side effects.  Pt remained up on unit, has had extensive conversation with peers and staff, asked for trazodone and went to sleep.  Pt clonidine held due to b/p at 98/57, per jia bautista.  Pt went to bed shortly after and is asleep at this time.        Intervention    PRN medication utilized:  yes - trazodone    Instructed in medication usage and effects  Medications administered as ordered  Encouraged to verbalize needs      Response    Verbalized understanding   Did patient take medications as ordered yes   Did patient interact with assessment?  yes     Plan    Will monitor for safety  Will monitor every 15 minutes as ordered  Will evaluate and promote the plan of " care    Last BM:  June 7, 2019  (Please chart in I/O as well)

## 2019-06-08 NOTE — NURSING NOTE
"Behavior   Note any precipitants to event or behavior   Describe level and action of any aggressive behavior or speech and associated interventions.     Anxiety: Patient denies at this time  Depression: Patient denies at this time  Pain  0  AVH   no  S/I   no  Plan  no  H/I   no  Plan  no    Affect   euthymic/normal      Note: Patient resting in his room, he is clean, alert, dressed appropriately for the weather, normal affect and good eye contact. He states that he is \"in a good mood\" this morning. He slept well and is hopeful to go home soon. He took his medication well, med education given. He denies anxiety/depression or S/I, H/I. Discussed the importance of continuing care on an outpatient basis and remaining compliant with all medication, understanding verbalized. Patient agrees to notify staff of any concerns and continues to work toward treatment goals.       Intervention    PRN medication utilized:  no    Instructed in medication usage and effects  Medications administered as ordered  Encouraged to verbalize needs      Response    Verbalized understanding   Did patient take medications as ordered yes   Did patient interact with assessment?  yes     Plan    Will monitor for safety  Will monitor every 15 minutes as ordered  Will evaluate and promote the plan of care    Last BM:  06/08/2019  (Please chart in I/O as well)    "

## 2019-06-08 NOTE — PLAN OF CARE
Problem: Patient Care Overview  Goal: Plan of Care Review  Outcome: Ongoing (interventions implemented as appropriate)      Problem: Overarching Goals (Adult)  Goal: Adheres to Safety Considerations for Self and Others  Outcome: Ongoing (interventions implemented as appropriate)    Goal: Optimized Coping Skills in Response to Life Stressors  Outcome: Ongoing (interventions implemented as appropriate)    Goal: Develops/Participates in Therapeutic [place] to Support Successful Transition  Outcome: Ongoing (interventions implemented as appropriate)      Problem: Suicidal Behavior (Adult)  Goal: Suicidal Behavior is Absent/Minimized/Managed  Outcome: Ongoing (interventions implemented as appropriate)  Patient denies any suicidal thoughts at this time    Problem: Depression  Goal: Patient will demonstrate the ability to initiate new constructive life skills outside of sessions on a consistent basis.  Outcome: Ongoing (interventions implemented as appropriate)      Problem: Ineffective Coping  Goal: Patient will demonstrate the ability to initiate new constructive life skills consistently.  Outcome: Ongoing (interventions implemented as appropriate)

## 2019-06-08 NOTE — PROGRESS NOTES
"6/8/2019    Chief Complaint: suicidal ideation, physical aggression and anxiety    Subjective:    Mr. Tyrel Gonsalez is a 23 yr old male that is inpatient on the adult U today he is seen in his room, he is resting in bed but speaks and is cooperative during our interview.    Tyrel reports that he does continue to feel better, he states that he is not concerned with his ex girlfriend, that she chose drugs over him. He reports that he has been living with his cousin in [place] and that he likes living here. He states that he tries to stay busy with sports, fishing, and hunting. 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   Tyrel states that although he draws disability due to learning disorder, he enjoys learning new things and would like to work part time when he is able.   Tyrel states that his coping strategy is going to focus on exercise and getting \"abs\"  He reports no SI/HI/AVH, he states that he is sorry for his behavior when he was first admitted.    He was reluctant about medications, but he is not reporting any AE.       Objective     Vital Signs    Temp:  [96.4 °F (35.8 °C)] 96.4 °F (35.8 °C)  Heart Rate:  [89] 89  Resp:  [18] 18  BP: (98)/(57) 98/57    Physical Exam:   General Appearance: alert, appears stated age and cooperative,  Hygiene:   fair  Gait & Station: Normal  Musculoskeletal: No tremors or abnormal involuntary movements    Mental Status Exam:   Cooperation:  Cooperative  Eye Contact:  Good  Psychomotor Behavior:  Hyperactive  Mood: \"Fine\" and Improving  Affect:  mood-congruent  Speech:  Normal  Thought Process:  Coherent  Associations: Goal Directed  Thought Content:     Normal   Suicidal:  None   Homicidal:  None   Hallucinations:  None   Delusion:  None  Cognitive Functioning:   Consciousness: awake and alert  Reliability:  fair  Insight:  Fair  Judgement:  Impaired  Impulse Control:  Impaired and but improving    Lab Results (last 24 hours)     ** No results found for " the last 24 hours. **        Imaging Results (last 24 hours)     ** No results found for the last 24 hours. **          Medicine:   Current Facility-Administered Medications:   •  acetaminophen (TYLENOL) tablet 650 mg, 650 mg, Oral, Q4H PRN, Kaiser Ovalle MD  •  aluminum-magnesium hydroxide-simethicone (MAALOX MAX) 400-400-40 MG/5ML suspension 15 mL, 15 mL, Oral, Q6H PRN, Kaiser Ovalle MD  •  CloNIDine (CATAPRES) tablet 0.1 mg, 0.1 mg, Oral, Q4H PRN, Kaiser Ovalle MD  •  CloNIDine (CATAPRES) tablet 0.1 mg, 0.1 mg, Oral, Nightly, Kaiser Ovalle MD, 0.1 mg at 06/06/19 2121  •  hydrOXYzine pamoate (VISTARIL) capsule 50 mg, 50 mg, Oral, Q6H PRN, Kaiser Oavlle MD  •  loperamide (IMODIUM) capsule 2 mg, 2 mg, Oral, Q2H PRN, Kaiser Ovalle MD  •  magnesium hydroxide (MILK OF MAGNESIA) suspension 2400 mg/10mL 10 mL, 10 mL, Oral, Daily PRN, Kaiser Ovalle MD  •  nicotine polacrilex (NICORETTE) gum 2 mg, 2 mg, Mouth/Throat, Q1H PRN, Kaiser Ovalle MD, 2 mg at 06/06/19 2107  •  traZODone (DESYREL) tablet 50 mg, 50 mg, Oral, Nightly PRN, Kaiser Ovalle MD, 50 mg at 06/07/19 2323  •  [COMPLETED] venlafaxine XR (EFFEXOR-XR) 24 hr capsule 37.5 mg, 37.5 mg, Oral, Daily With Breakfast, 37.5 mg at 06/07/19 0900 **FOLLOWED BY** [COMPLETED] venlafaxine XR (EFFEXOR-XR) 24 hr capsule 75 mg, 75 mg, Oral, Daily With Breakfast, 75 mg at 06/08/19 0849 **FOLLOWED BY** [START ON 6/9/2019] venlafaxine XR (EFFEXOR-XR) 24 hr capsule 150 mg, 150 mg, Oral, Daily With Breakfast, Kaiser Ovalle MD    Diagnoses/Assessment:     Severe episode of recurrent major depressive disorder, without psychotic features (CMS/HCC)    Suicidal ideation    Impulse control disorder    Borderline intellectual functioning      Treatment Plan:    1) Will continue care for the patient on the behavioral health unit at [facility] to ensure patient safety.  2) Will continue to provide treatment with the  unit milieu, activities, therapies and psychopharmacological management.  3) Patient to be placed on or continued on  Q15 minute checks  and Suicide and Aggression precautions.  4) Pertinent medical issues: none  5) Will order following labs: none  6) Will make the following medication changes:   --Cont titration of Wellbutrin 150mg tomorrow  --Discontinue Clonidine 0.1mg   --Begin Tenex 1mg at bedtime  7) Will continue discharge planning as appropriate for patient.  8) Psychotherapy provided for less than 16 minutes.    Treatment plan and medication risks and benefits discussed with: Patient    KE Hinton  06/08/19  10:13 AM

## 2019-06-09 PROCEDURE — 99232 SBSQ HOSP IP/OBS MODERATE 35: CPT | Performed by: NURSE PRACTITIONER

## 2019-06-09 RX ORDER — VENLAFAXINE HYDROCHLORIDE 75 MG/1
75 CAPSULE, EXTENDED RELEASE ORAL
Status: COMPLETED | OUTPATIENT
Start: 2019-06-09 | End: 2019-06-09

## 2019-06-09 RX ORDER — VENLAFAXINE HYDROCHLORIDE 75 MG/1
150 CAPSULE, EXTENDED RELEASE ORAL
Status: DISCONTINUED | OUTPATIENT
Start: 2019-06-10 | End: 2019-06-10 | Stop reason: HOSPADM

## 2019-06-09 RX ADMIN — GUANFACINE 1 MG: 1 TABLET ORAL at 21:53

## 2019-06-09 RX ADMIN — VENLAFAXINE HYDROCHLORIDE 75 MG: 75 CAPSULE, EXTENDED RELEASE ORAL at 15:05

## 2019-06-09 RX ADMIN — TRAZODONE HYDROCHLORIDE 50 MG: 50 TABLET ORAL at 21:53

## 2019-06-09 RX ADMIN — VENLAFAXINE HYDROCHLORIDE 150 MG: 75 CAPSULE, EXTENDED RELEASE ORAL at 08:03

## 2019-06-09 NOTE — NURSING NOTE
Behavior   Note any precipitants to event or behavior   Describe level and action of any aggressive behavior or speech and associated interventions.     Anxiety: Patient denies at this time  Depression: Patient denies at this time  Pain  0  AVH   no  S/I   no  Plan  no  H/I   no  Plan  no    Affect   euthymic/normal      Note: Patient has been up in the hallway, interacting with staff. He is alert, dressed appropriately for the weather, clean with normal affect and good eye contact. He denies feeling any anxiety/depression or S/I. He is looking forward to going home tomorrow and feels he is ready. Discussed with patient the importance of continuing care on an outpatient basis and remaining complaint with all medications. Patient verbalized understanding.       Intervention    PRN medication utilized:  no    Instructed in medication usage and effects  Medications administered as ordered  Encouraged to verbalize needs      Response    Verbalized understanding   Did patient take medications as ordered yes   Did patient interact with assessment?  yes     Plan    Will monitor for safety  Will monitor every 15 minutes as ordered  Will evaluate and promote the plan of care    Last BM:  unknown date  (Please chart in I/O as well)

## 2019-06-09 NOTE — PLAN OF CARE
Problem: Patient Care Overview  Goal: Plan of Care Review  Outcome: Ongoing (interventions implemented as appropriate)      Problem: Overarching Goals (Adult)  Goal: Adheres to Safety Considerations for Self and Others  Outcome: Ongoing (interventions implemented as appropriate)    Goal: Optimized Coping Skills in Response to Life Stressors  Outcome: Ongoing (interventions implemented as appropriate)    Goal: Develops/Participates in Therapeutic [place] to Support Successful Transition  Outcome: Ongoing (interventions implemented as appropriate)      Problem: Suicidal Behavior (Adult)  Goal: Suicidal Behavior is Absent/Minimized/Managed  Outcome: Outcome(s) achieved Date Met: 06/09/19  Patient has denied suicidal ideation or self harm x3 days.    Problem: Depression  Goal: Patient will demonstrate the ability to initiate new constructive life skills outside of sessions on a consistent basis.  Outcome: Ongoing (interventions implemented as appropriate)      Problem: Ineffective Coping  Goal: Patient will demonstrate the ability to initiate new constructive life skills consistently.  Outcome: Ongoing (interventions implemented as appropriate)

## 2019-06-09 NOTE — PROGRESS NOTES
"6/9/2019    Chief Complaint: suicidal ideation, anxiety and depression    Subjective:    Mr. Tyrel Gonsalez is a 23 yr old male that is inpatient on the adult U Today he is resting in bed, he reports that he is bored and just will sleep.  He is alert/oriented  He does not endorse any SI/HI/AVH. He states that he is excited about being discharged soon and is going to go with this uncle that is a , he is excited about seeing new places and getting out of the area for a little while.   Tyrel is friendly, he is smiling and interacts well with others.   Tyrel reports that his medications are working well, he states that he intends to stay compliant with medications after discharge. We discussed importance of maintaining appointments as well.     Objective     Vital Signs    Temp:  [97.3 °F (36.3 °C)-98.5 °F (36.9 °C)] 97.3 °F (36.3 °C)  Heart Rate:  [73-82] 82  Resp:  [18] 18  BP: (122)/(57-68) 122/57    Physical Exam:   General Appearance: alert, appears stated age and cooperative,  Hygiene:   good  Gait & Station: Normal  Musculoskeletal: No tremors or abnormal involuntary movements    Mental Status Exam:   Cooperation:  Cooperative  Eye Contact:  Good  Psychomotor Behavior:  Appropriate  Mood: \"Fine\" and Improving  Affect:  mood-congruent  Speech:  Normal  Thought Process:  Coherent  Associations: Goal Directed  Thought Content:     Mood congurent   Suicidal:  None   Homicidal:  None   Hallucinations:  None   Delusion:  None  Cognitive Functioning:   Consciousness: awake and alert  Reliability:  fair  Insight:  Fair  Judgement:  Fair  Impulse Control:  Impaired and improving    Lab Results (last 24 hours)     ** No results found for the last 24 hours. **        Imaging Results (last 24 hours)     ** No results found for the last 24 hours. **          Medicine:   Current Facility-Administered Medications:   •  acetaminophen (TYLENOL) tablet 650 mg, 650 mg, Oral, Q4H PRN, Kaiser Ovalle MD  •  " aluminum-magnesium hydroxide-simethicone (MAALOX MAX) 400-400-40 MG/5ML suspension 15 mL, 15 mL, Oral, Q6H PRN, Kaiser Ovalle MD  •  CloNIDine (CATAPRES) tablet 0.1 mg, 0.1 mg, Oral, Q4H PRN, Kaiser Ovalle MD  •  guanFACINE (TENEX) tablet 1 mg, 1 mg, Oral, Nightly, Mirta Roman APRN, 1 mg at 06/08/19 2131  •  hydrOXYzine pamoate (VISTARIL) capsule 50 mg, 50 mg, Oral, Q6H PRN, Kaiser Ovalle MD  •  loperamide (IMODIUM) capsule 2 mg, 2 mg, Oral, Q2H PRN, Kaiser Ovalle MD  •  magnesium hydroxide (MILK OF MAGNESIA) suspension 2400 mg/10mL 10 mL, 10 mL, Oral, Daily PRN, Kaiser Ovalle MD  •  nicotine polacrilex (NICORETTE) gum 2 mg, 2 mg, Mouth/Throat, Q1H PRN, Kaiser Ovalle MD, 2 mg at 06/06/19 2107  •  traZODone (DESYREL) tablet 50 mg, 50 mg, Oral, Nightly PRN, Kaiser Ovalle MD, 50 mg at 06/08/19 2337    Diagnoses/Assessment:     Severe episode of recurrent major depressive disorder, without psychotic features (CMS/HCC)    Suicidal ideation    Impulse control disorder    Borderline intellectual functioning      Treatment Plan:    1) Will continue care for the patient on the behavioral health unit at [facility] to ensure patient safety.  2) Will continue to provide treatment with the unit milieu, activities, therapies and psychopharmacological management.  3) Patient to be placed on or continued on  Q15 minute checks  and Suicide and Aggression precautions.  4) Pertinent medical issues: none  5) Will order following labs: none  6) Will make the following medication changes:   --cont Tenex 1 mg nightly  --cont effexor 150 mg daily  7) Will continue discharge planning as appropriate for patient.  8) Psychotherapy provided for less than 16 minutes.    Treatment plan and medication risks and benefits discussed with: Patient    KE Hinton  06/09/19  11:46 AM

## 2019-06-09 NOTE — PLAN OF CARE
Problem: Patient Care Overview  Goal: Individualization and Mutuality  Outcome: Ongoing (interventions implemented as appropriate)    Goal: Discharge Needs Assessment  Outcome: Ongoing (interventions implemented as appropriate)    Goal: Interprofessional Rounds/Family Conf  Outcome: Ongoing (interventions implemented as appropriate)      Problem: Overarching Goals (Adult)  Goal: Adheres to Safety Considerations for Self and Others  Outcome: Ongoing (interventions implemented as appropriate)    Goal: Optimized Coping Skills in Response to Life Stressors  Outcome: Ongoing (interventions implemented as appropriate)    Goal: Develops/Participates in Therapeutic [place] to Support Successful Transition  Outcome: Ongoing (interventions implemented as appropriate)

## 2019-06-10 VITALS
OXYGEN SATURATION: 95 % | WEIGHT: 152 LBS | SYSTOLIC BLOOD PRESSURE: 101 MMHG | TEMPERATURE: 97.2 F | HEIGHT: 65 IN | HEART RATE: 92 BPM | DIASTOLIC BLOOD PRESSURE: 52 MMHG | RESPIRATION RATE: 18 BRPM | BODY MASS INDEX: 25.33 KG/M2

## 2019-06-10 PROBLEM — R45.851 SUICIDAL IDEATION: Status: RESOLVED | Noted: 2019-06-06 | Resolved: 2019-06-10

## 2019-06-10 PROCEDURE — 99238 HOSP IP/OBS DSCHRG MGMT 30/<: CPT | Performed by: PSYCHIATRY & NEUROLOGY

## 2019-06-10 RX ORDER — TRAZODONE HYDROCHLORIDE 50 MG/1
50 TABLET ORAL NIGHTLY PRN
Qty: 30 TABLET | Refills: 0 | Status: SHIPPED | OUTPATIENT
Start: 2019-06-10 | End: 2019-07-17 | Stop reason: SDUPTHER

## 2019-06-10 RX ORDER — VENLAFAXINE HYDROCHLORIDE 150 MG/1
150 CAPSULE, EXTENDED RELEASE ORAL
Qty: 30 CAPSULE | Refills: 0 | Status: SHIPPED | OUTPATIENT
Start: 2019-06-11 | End: 2019-07-17 | Stop reason: SDUPTHER

## 2019-06-10 RX ORDER — GUANFACINE 1 MG/1
1 TABLET ORAL NIGHTLY
Qty: 30 TABLET | Refills: 0 | Status: SHIPPED | OUTPATIENT
Start: 2019-06-10 | End: 2019-07-17 | Stop reason: SDUPTHER

## 2019-06-10 RX ADMIN — VENLAFAXINE HYDROCHLORIDE 150 MG: 75 CAPSULE, EXTENDED RELEASE ORAL at 08:16

## 2019-06-10 NOTE — NURSING NOTE
Patient discharged home with family member. Teaching with written materials performed with both patient and family member. Pt verbalized understanding of location, date, and time of follow up appointments.
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
## 2019-06-10 NOTE — DISCHARGE SUMMARY
CSW and MD met with pt and his cousin to review safety/dc plan. Pt presented to interview room, dressed casually, alert and oriented x3. .   Pt is pleasant and cooperative, eye contact was good Mood is fair, affect is neutral. MD discussed concerns and questions, MD discussed medications, and how pt. can be more compliant as pt. has been noncompliance issues. .  CSW encouraged pt. to stay busy and keep himself engaged into doing something of his interest as one of the reasons he was suicidal was because his gf uses dope and does not bring any positivity in his life. Pt. is planning to stay away from his gf Cousin seems to be supportive Pt denied SI/HI, AVH.  CSW has scheduled pt.  Pt did participate in individual and group tx, was med compliant.. Pt educated on Crisis Hotline, advised to call as needed. SW has scheduled pt. with [facility] for therapy and medications. BPRS was completed upon dc.

## 2019-06-10 NOTE — NURSING NOTE
"Behavior   Note any precipitants to event or behavior   Describe level and action of any aggressive behavior or speech and associated interventions.     Anxiety: Patient denies at this time  Depression: Patient denies at this time  Pain  0  AVH   no  S/I   no  Plan  no  H/I   no  Plan  no    Affect   euthymic/normal      Note:pt seen on unit at nurses station.  Pt is pleasant, speaking at length to peers, smiling, laughing, had snack and took a shower.  Pt took all medications as ordered, states he feels better, and it is noted that pts rate of speech was not as rapid as on previous nights.  Pt denies all issues, states he is looking forward to making some changes in his life and \"I want to do better, for me, I have to, kelvin what I was doing wasn't working.\"  Pt is encouraged to continue treatment and remain medication compliant upon discharge, and to practice coping skills.  Pt reports no ill effects from medications.        Intervention    PRN medication utilized:  yes - trazodone    Instructed in medication usage and effects  Medications administered as ordered  Encouraged to verbalize needs      Response    Verbalized understanding   Did patient take medications as ordered yes   Did patient interact with assessment?  yes     Plan    Will monitor for safety  Will monitor every 15 minutes as ordered  Will evaluate and promote the plan of care    Last BM:  June 9, 2019  (Please chart in I/O as well)    "

## 2019-06-10 NOTE — PLAN OF CARE
Problem: Patient Care Overview  Goal: Plan of Care Review  Outcome: Ongoing (interventions implemented as appropriate)    Goal: Individualization and Mutuality  Outcome: Ongoing (interventions implemented as appropriate)    Goal: Discharge Needs Assessment  Outcome: Ongoing (interventions implemented as appropriate)    Goal: Interprofessional Rounds/Family Conf  Outcome: Ongoing (interventions implemented as appropriate)      Problem: Overarching Goals (Adult)  Goal: Adheres to Safety Considerations for Self and Others  Outcome: Ongoing (interventions implemented as appropriate)    Goal: Optimized Coping Skills in Response to Life Stressors  Outcome: Ongoing (interventions implemented as appropriate)    Goal: Develops/Participates in Therapeutic [place] to Support Successful Transition  Outcome: Ongoing (interventions implemented as appropriate)

## 2019-06-19 ENCOUNTER — TELEPHONE (OUTPATIENT)
Dept: NEUROLOGY | Age: 24
End: 2019-06-19

## 2019-06-19 NOTE — TELEPHONE ENCOUNTER
Called patient about an appointment with Dr David Chambers, phone was not accepting calls at the moment.

## 2019-06-19 NOTE — TELEPHONE ENCOUNTER
Called spoke with patient about an appointment with Dr Lilly Madrid in the morning , patient is going to talk with his cousin and call me back . He is aware of the appointment.

## 2019-06-20 ENCOUNTER — TELEPHONE (OUTPATIENT)
Dept: NEUROLOGY | Age: 24
End: 2019-06-20

## 2019-07-17 ENCOUNTER — HOSPITAL ENCOUNTER (EMERGENCY)
Facility: HOSPITAL | Age: 24
Discharge: HOME | End: 2019-07-17
Attending: EMERGENCY MEDICINE | Admitting: EMERGENCY MEDICINE

## 2019-07-17 VITALS
HEIGHT: 65 IN | DIASTOLIC BLOOD PRESSURE: 90 MMHG | SYSTOLIC BLOOD PRESSURE: 122 MMHG | HEART RATE: 94 BPM | OXYGEN SATURATION: 100 % | TEMPERATURE: 98 F | WEIGHT: 159.4 LBS | RESPIRATION RATE: 18 BRPM | BODY MASS INDEX: 26.56 KG/M2

## 2019-07-17 DIAGNOSIS — F32.A DEPRESSION, UNSPECIFIED DEPRESSION TYPE: Primary | ICD-10-CM

## 2019-07-17 PROCEDURE — 99282 EMERGENCY DEPT VISIT SF MDM: CPT

## 2019-07-17 RX ORDER — GUANFACINE 1 MG/1
1 TABLET ORAL NIGHTLY
Qty: 14 TABLET | Refills: 0 | Status: SHIPPED | OUTPATIENT
Start: 2019-07-17 | End: 2019-07-31

## 2019-07-17 RX ORDER — TRAZODONE HYDROCHLORIDE 50 MG/1
50 TABLET ORAL NIGHTLY PRN
Qty: 14 TABLET | Refills: 0 | Status: SHIPPED | OUTPATIENT
Start: 2019-07-17 | End: 2019-07-31

## 2019-07-17 RX ORDER — VENLAFAXINE HYDROCHLORIDE 150 MG/1
150 CAPSULE, EXTENDED RELEASE ORAL
Qty: 14 CAPSULE | Refills: 0 | Status: SHIPPED | OUTPATIENT
Start: 2019-07-17 | End: 2019-07-31

## 2019-07-19 VITALS
TEMPERATURE: 98.5 F | RESPIRATION RATE: 18 BRPM | DIASTOLIC BLOOD PRESSURE: 69 MMHG | WEIGHT: 156 LBS | HEART RATE: 105 BPM | SYSTOLIC BLOOD PRESSURE: 111 MMHG | OXYGEN SATURATION: 97 % | HEIGHT: 65 IN | BODY MASS INDEX: 25.99 KG/M2

## 2019-07-19 PROCEDURE — 99283 EMERGENCY DEPT VISIT LOW MDM: CPT

## 2019-07-20 ENCOUNTER — HOSPITAL ENCOUNTER (EMERGENCY)
Facility: HOSPITAL | Age: 24
Discharge: HOME | End: 2019-07-20
Attending: FAMILY MEDICINE | Admitting: FAMILY MEDICINE

## 2019-07-20 DIAGNOSIS — L03.011 PARONYCHIA OF RIGHT RING FINGER: Primary | ICD-10-CM

## 2019-07-20 RX ORDER — SULFAMETHOXAZOLE AND TRIMETHOPRIM 800; 160 MG/1; MG/1
1 TABLET ORAL 2 TIMES DAILY
Qty: 14 TABLET | Refills: 0 | Status: SHIPPED | OUTPATIENT
Start: 2019-07-20 | End: 2019-07-27

## 2019-07-20 RX ORDER — SULFAMETHOXAZOLE AND TRIMETHOPRIM 800; 160 MG/1; MG/1
1 TABLET ORAL ONCE
Status: COMPLETED | OUTPATIENT
Start: 2019-07-20 | End: 2019-07-20

## 2019-07-20 RX ORDER — IBUPROFEN 800 MG/1
800 TABLET ORAL ONCE
Status: COMPLETED | OUTPATIENT
Start: 2019-07-20 | End: 2019-07-20

## 2019-07-20 RX ADMIN — SULFAMETHOXAZOLE AND TRIMETHOPRIM 160 MG: 800; 160 TABLET ORAL at 01:36

## 2019-07-20 RX ADMIN — IBUPROFEN 800 MG: 800 TABLET ORAL at 01:36

## 2019-08-05 ENCOUNTER — HOSPITAL ENCOUNTER (INPATIENT)
Facility: HOSPITAL | Age: 24
LOS: 3 days | Discharge: HOME | End: 2019-08-08
Attending: PSYCHIATRY & NEUROLOGY | Admitting: PSYCHIATRY & NEUROLOGY

## 2019-08-05 ENCOUNTER — HOSPITAL ENCOUNTER (EMERGENCY)
Facility: HOSPITAL | Age: 24
Discharge: PSYCHIATRIC OR SUBSTANCE USE FACILITY | End: 2019-08-05
Attending: EMERGENCY MEDICINE

## 2019-08-05 VITALS
HEART RATE: 85 BPM | TEMPERATURE: 98.6 F | OXYGEN SATURATION: 99 % | WEIGHT: 159.9 LBS | SYSTOLIC BLOOD PRESSURE: 133 MMHG | RESPIRATION RATE: 20 BRPM | HEIGHT: 65 IN | BODY MASS INDEX: 26.64 KG/M2 | DIASTOLIC BLOOD PRESSURE: 82 MMHG

## 2019-08-05 DIAGNOSIS — R45.851 DEPRESSION WITH SUICIDAL IDEATION: Primary | ICD-10-CM

## 2019-08-05 DIAGNOSIS — F32.A DEPRESSION WITH SUICIDAL IDEATION: Primary | ICD-10-CM

## 2019-08-05 LAB
ALBUMIN SERPL-MCNC: 4.4 G/DL (ref 3.5–5.2)
ALBUMIN/GLOB SERPL: 1.4 G/DL
ALP SERPL-CCNC: 89 U/L (ref 39–117)
ALT SERPL W P-5'-P-CCNC: 39 U/L (ref 1–41)
AMPHET+METHAMPHET UR QL: NEGATIVE
AMPHETAMINES UR QL: NEGATIVE
ANION GAP SERPL CALCULATED.3IONS-SCNC: 11 MMOL/L (ref 5–15)
APAP SERPL-MCNC: <5 MCG/ML (ref 10–30)
AST SERPL-CCNC: 22 U/L (ref 1–40)
BACTERIA UR QL AUTO: ABNORMAL /HPF
BARBITURATES UR QL SCN: NEGATIVE
BASOPHILS # BLD AUTO: 0.06 10*3/MM3 (ref 0–0.2)
BASOPHILS NFR BLD AUTO: 0.6 % (ref 0–1.5)
BENZODIAZ UR QL SCN: NEGATIVE
BILIRUB SERPL-MCNC: 0.8 MG/DL (ref 0.2–1.2)
BILIRUB UR QL STRIP: NEGATIVE
BUN BLD-MCNC: 10 MG/DL (ref 6–20)
BUN/CREAT SERPL: 11.5 (ref 7–25)
BUPRENORPHINE SERPL-MCNC: NEGATIVE NG/ML
CALCIUM SPEC-SCNC: 9.5 MG/DL (ref 8.6–10.5)
CANNABINOIDS SERPL QL: NEGATIVE
CHLORIDE SERPL-SCNC: 104 MMOL/L (ref 98–107)
CLARITY UR: ABNORMAL
CO2 SERPL-SCNC: 27 MMOL/L (ref 22–29)
COCAINE UR QL: NEGATIVE
COLOR UR: YELLOW
CREAT BLD-MCNC: 0.87 MG/DL (ref 0.76–1.27)
DEPRECATED RDW RBC AUTO: 37.5 FL (ref 37–54)
EOSINOPHIL # BLD AUTO: 0.3 10*3/MM3 (ref 0–0.4)
EOSINOPHIL NFR BLD AUTO: 3 % (ref 0.3–6.2)
ERYTHROCYTE [DISTWIDTH] IN BLOOD BY AUTOMATED COUNT: 12.7 % (ref 12.3–15.4)
ETHANOL BLD-MCNC: <10 MG/DL (ref 0–10)
ETHANOL UR QL: <0.01 %
GFR SERPL CREATININE-BSD FRML MDRD: 110 ML/MIN/1.73
GLOBULIN UR ELPH-MCNC: 3.1 GM/DL
GLUCOSE BLD-MCNC: 120 MG/DL (ref 65–99)
GLUCOSE UR STRIP-MCNC: NEGATIVE MG/DL
HCT VFR BLD AUTO: 44.7 % (ref 37.5–51)
HGB BLD-MCNC: 15.4 G/DL (ref 13–17.7)
HGB UR QL STRIP.AUTO: ABNORMAL
HOLD SPECIMEN: NORMAL
HOLD SPECIMEN: NORMAL
HYALINE CASTS UR QL AUTO: ABNORMAL /LPF
IMM GRANULOCYTES # BLD AUTO: 0.02 10*3/MM3 (ref 0–0.05)
IMM GRANULOCYTES NFR BLD AUTO: 0.2 % (ref 0–0.5)
KETONES UR QL STRIP: NEGATIVE
LEUKOCYTE ESTERASE UR QL STRIP.AUTO: NEGATIVE
LYMPHOCYTES # BLD AUTO: 3.93 10*3/MM3 (ref 0.7–3.1)
LYMPHOCYTES NFR BLD AUTO: 39.2 % (ref 19.6–45.3)
MCH RBC QN AUTO: 28.3 PG (ref 26.6–33)
MCHC RBC AUTO-ENTMCNC: 34.5 G/DL (ref 31.5–35.7)
MCV RBC AUTO: 82 FL (ref 79–97)
METHADONE UR QL SCN: NEGATIVE
MONOCYTES # BLD AUTO: 0.96 10*3/MM3 (ref 0.1–0.9)
MONOCYTES NFR BLD AUTO: 9.6 % (ref 5–12)
NEUTROPHILS # BLD AUTO: 4.75 10*3/MM3 (ref 1.7–7)
NEUTROPHILS NFR BLD AUTO: 47.4 % (ref 42.7–76)
NITRITE UR QL STRIP: NEGATIVE
NRBC BLD AUTO-RTO: 0 /100 WBC (ref 0–0.2)
OPIATES UR QL: NEGATIVE
OXYCODONE UR QL SCN: NEGATIVE
PCP UR QL SCN: NEGATIVE
PH UR STRIP.AUTO: 7.5 [PH] (ref 5–9)
PLATELET # BLD AUTO: 201 10*3/MM3 (ref 140–450)
PMV BLD AUTO: 12.4 FL (ref 6–12)
POTASSIUM BLD-SCNC: 3.9 MMOL/L (ref 3.5–5.2)
PROPOXYPH UR QL: NEGATIVE
PROT SERPL-MCNC: 7.5 G/DL (ref 6–8.5)
PROT UR QL STRIP: NEGATIVE
RBC # BLD AUTO: 5.45 10*6/MM3 (ref 4.14–5.8)
RBC # UR: ABNORMAL /HPF
REF LAB TEST METHOD: ABNORMAL
SALICYLATES SERPL-MCNC: <0.3 MG/DL
SODIUM BLD-SCNC: 142 MMOL/L (ref 136–145)
SP GR UR STRIP: 1.02 (ref 1–1.03)
SQUAMOUS #/AREA URNS HPF: ABNORMAL /HPF
TRICYCLICS UR QL SCN: NEGATIVE
UROBILINOGEN UR QL STRIP: ABNORMAL
WBC NRBC COR # BLD: 10.02 10*3/MM3 (ref 3.4–10.8)
WBC UR QL AUTO: ABNORMAL /HPF
WHOLE BLOOD HOLD SPECIMEN: NORMAL

## 2019-08-05 PROCEDURE — 80053 COMPREHEN METABOLIC PANEL: CPT | Performed by: EMERGENCY MEDICINE

## 2019-08-05 PROCEDURE — 80307 DRUG TEST PRSMV CHEM ANLYZR: CPT | Performed by: EMERGENCY MEDICINE

## 2019-08-05 PROCEDURE — 81001 URINALYSIS AUTO W/SCOPE: CPT | Performed by: EMERGENCY MEDICINE

## 2019-08-05 PROCEDURE — 85025 COMPLETE CBC W/AUTO DIFF WBC: CPT | Performed by: EMERGENCY MEDICINE

## 2019-08-05 PROCEDURE — 82306 VITAMIN D 25 HYDROXY: CPT | Performed by: PSYCHIATRY & NEUROLOGY

## 2019-08-05 PROCEDURE — 83036 HEMOGLOBIN GLYCOSYLATED A1C: CPT | Performed by: FAMILY MEDICINE

## 2019-08-05 PROCEDURE — 80306 DRUG TEST PRSMV INSTRMNT: CPT | Performed by: EMERGENCY MEDICINE

## 2019-08-05 PROCEDURE — 82607 VITAMIN B-12: CPT | Performed by: PSYCHIATRY & NEUROLOGY

## 2019-08-05 RX ORDER — NICOTINE 21 MG/24HR
1 PATCH, TRANSDERMAL 24 HOURS TRANSDERMAL EVERY 24 HOURS
Status: DISCONTINUED | OUTPATIENT
Start: 2019-08-06 | End: 2019-08-06

## 2019-08-05 RX ORDER — TRAZODONE HYDROCHLORIDE 50 MG/1
50 TABLET ORAL NIGHTLY PRN
Status: DISCONTINUED | OUTPATIENT
Start: 2019-08-05 | End: 2019-08-06

## 2019-08-05 RX ORDER — CLONIDINE HYDROCHLORIDE 0.1 MG/1
0.1 TABLET ORAL EVERY 4 HOURS PRN
Status: CANCELLED | OUTPATIENT
Start: 2019-08-05

## 2019-08-05 RX ORDER — ACETAMINOPHEN 325 MG/1
650 TABLET ORAL EVERY 4 HOURS PRN
Status: DISCONTINUED | OUTPATIENT
Start: 2019-08-05 | End: 2019-08-08 | Stop reason: HOSPADM

## 2019-08-05 RX ORDER — HYDROXYZINE PAMOATE 50 MG/1
50 CAPSULE ORAL EVERY 6 HOURS PRN
Status: DISCONTINUED | OUTPATIENT
Start: 2019-08-05 | End: 2019-08-08 | Stop reason: HOSPADM

## 2019-08-05 RX ORDER — LOPERAMIDE HYDROCHLORIDE 2 MG/1
2 CAPSULE ORAL
Status: DISCONTINUED | OUTPATIENT
Start: 2019-08-05 | End: 2019-08-08 | Stop reason: HOSPADM

## 2019-08-05 RX ORDER — ALUMINA, MAGNESIA, AND SIMETHICONE 2400; 2400; 240 MG/30ML; MG/30ML; MG/30ML
15 SUSPENSION ORAL EVERY 6 HOURS PRN
Status: DISCONTINUED | OUTPATIENT
Start: 2019-08-05 | End: 2019-08-08 | Stop reason: HOSPADM

## 2019-08-05 RX ORDER — TRAZODONE HYDROCHLORIDE 100 MG/1
100 TABLET ORAL NIGHTLY
Status: ON HOLD | COMMUNITY
End: 2019-08-08 | Stop reason: SDUPTHER

## 2019-08-05 RX ORDER — ONDANSETRON 4 MG/1
4 TABLET, ORALLY DISINTEGRATING ORAL ONCE
Status: COMPLETED | OUTPATIENT
Start: 2019-08-05 | End: 2019-08-05

## 2019-08-05 RX ORDER — VENLAFAXINE 50 MG/1
150 TABLET ORAL DAILY
COMMUNITY
End: 2019-08-08 | Stop reason: HOSPADM

## 2019-08-05 RX ORDER — LOPERAMIDE HYDROCHLORIDE 2 MG/1
2 CAPSULE ORAL
Status: CANCELLED | OUTPATIENT
Start: 2019-08-05

## 2019-08-05 RX ORDER — NICOTINE 21 MG/24HR
1 PATCH, TRANSDERMAL 24 HOURS TRANSDERMAL EVERY 24 HOURS
Status: CANCELLED | OUTPATIENT
Start: 2019-08-06

## 2019-08-05 RX ORDER — ACETAMINOPHEN 325 MG/1
650 TABLET ORAL EVERY 4 HOURS PRN
Status: CANCELLED | OUTPATIENT
Start: 2019-08-05

## 2019-08-05 RX ORDER — HYDROXYZINE PAMOATE 25 MG/1
50 CAPSULE ORAL EVERY 6 HOURS PRN
Status: CANCELLED | OUTPATIENT
Start: 2019-08-05

## 2019-08-05 RX ORDER — TRAZODONE HYDROCHLORIDE 50 MG/1
50 TABLET ORAL NIGHTLY PRN
Status: CANCELLED | OUTPATIENT
Start: 2019-08-05

## 2019-08-05 RX ORDER — CLONIDINE HYDROCHLORIDE 0.1 MG/1
0.1 TABLET ORAL EVERY 4 HOURS PRN
Status: DISCONTINUED | OUTPATIENT
Start: 2019-08-05 | End: 2019-08-08 | Stop reason: HOSPADM

## 2019-08-05 RX ORDER — ALUMINA, MAGNESIA, AND SIMETHICONE 2400; 2400; 240 MG/30ML; MG/30ML; MG/30ML
15 SUSPENSION ORAL EVERY 6 HOURS PRN
Status: CANCELLED | OUTPATIENT
Start: 2019-08-05

## 2019-08-05 RX ADMIN — ONDANSETRON 4 MG: 4 TABLET, ORALLY DISINTEGRATING ORAL at 20:39

## 2019-08-06 LAB — HBA1C MFR BLD: 5.1 % (ref 4.8–5.6)

## 2019-08-06 PROCEDURE — 93005 ELECTROCARDIOGRAM TRACING: CPT | Performed by: PSYCHIATRY & NEUROLOGY

## 2019-08-06 PROCEDURE — 93010 ELECTROCARDIOGRAM REPORT: CPT | Performed by: INTERNAL MEDICINE

## 2019-08-06 PROCEDURE — 99232 SBSQ HOSP IP/OBS MODERATE 35: CPT | Performed by: FAMILY MEDICINE

## 2019-08-06 PROCEDURE — 99223 1ST HOSP IP/OBS HIGH 75: CPT | Performed by: PSYCHIATRY & NEUROLOGY

## 2019-08-06 RX ORDER — BUPROPION HYDROCHLORIDE 150 MG/1
150 TABLET ORAL DAILY
Status: DISCONTINUED | OUTPATIENT
Start: 2019-08-06 | End: 2019-08-08 | Stop reason: HOSPADM

## 2019-08-06 RX ORDER — TRAZODONE HYDROCHLORIDE 100 MG/1
100 TABLET ORAL NIGHTLY
Status: DISCONTINUED | OUTPATIENT
Start: 2019-08-06 | End: 2019-08-08 | Stop reason: HOSPADM

## 2019-08-06 RX ORDER — GUANFACINE 1 MG/1
1 TABLET ORAL NIGHTLY
Status: DISCONTINUED | OUTPATIENT
Start: 2019-08-06 | End: 2019-08-08 | Stop reason: HOSPADM

## 2019-08-06 RX ADMIN — GUANFACINE HYDROCHLORIDE 1 MG: 1 TABLET ORAL at 20:14

## 2019-08-06 RX ADMIN — ALUMINUM HYDROXIDE, MAGNESIUM HYDROXIDE, AND DIMETHICONE 15 ML: 400; 400; 40 SUSPENSION ORAL at 03:02

## 2019-08-06 RX ADMIN — NICOTINE POLACRILEX 2 MG: 2 GUM, CHEWING BUCCAL at 19:34

## 2019-08-06 RX ADMIN — TRAZODONE HYDROCHLORIDE 100 MG: 100 TABLET ORAL at 20:14

## 2019-08-06 RX ADMIN — NICOTINE POLACRILEX 2 MG: 2 GUM, CHEWING BUCCAL at 17:35

## 2019-08-06 RX ADMIN — BUPROPION HYDROCHLORIDE 150 MG: 150 TABLET, FILM COATED, EXTENDED RELEASE ORAL at 17:27

## 2019-08-06 RX ADMIN — NICOTINE POLACRILEX 2 MG: 2 GUM, CHEWING BUCCAL at 10:35
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
## 2019-08-06 NOTE — NURSING NOTE
Patient frequently seeks out staff. Patient is flirtatious with female staff and peers. Patient is intrusive and doesn't realize he has been making his peers uncomfortable. Patient does not follow direction when told to give peers space.

## 2019-08-06 NOTE — NURSING NOTE
Review of Current Symptoms--only current symptoms        · General   NONE    · Eyes    None     · ENT/Mouth    None    · Cardio    None    · Resp    None    · GI     Nausea/vomiting    ·     None    · MS    None    · Skin/Hair/Nails    None    · Neuro    None

## 2019-08-06 NOTE — PLAN OF CARE
Problem: Patient Care Overview  Goal: Plan of Care Review  Outcome: Ongoing (interventions implemented as appropriate)   08/06/19 1240   Coping/Psychosocial   Plan of Care Reviewed With patient   Plan of Care Review   Progress improving   Coping/Psychosocial   Patient Agreement with Plan of Care agrees     Goal: Individualization and Mutuality  Outcome: Ongoing (interventions implemented as appropriate)    Goal: Discharge Needs Assessment  Outcome: Ongoing (interventions implemented as appropriate)    Goal: Interprofessional Rounds/Family Conf  Outcome: Ongoing (interventions implemented as appropriate)      Problem: Overarching Goals (Adult)  Goal: Adheres to Safety Considerations for Self and Others  Outcome: Ongoing (interventions implemented as appropriate)    Goal: Optimized Coping Skills in Response to Life Stressors  Outcome: Ongoing (interventions implemented as appropriate)    Goal: Develops/Participates in Therapeutic [place] to Support Successful Transition  Outcome: Ongoing (interventions implemented as appropriate)      Problem: Suicidal Behavior (Adult)  Goal: Suicidal Behavior is Absent/Minimized/Managed  Outcome: Ongoing (interventions implemented as appropriate)      Problem: Mood Impairment (Depressive Signs/Symptoms) (Adult)  Goal: Improved Mood Symptoms (Depressive Signs/Symptoms)  Outcome: Ongoing (interventions implemented as appropriate)      Problem: Feelings of Worthlessness, Hopelessness, Excessive Guilt (Depressive Signs/Symptoms) (Adult)  Goal: Enhanced Self-Esteem/Confidence (Depressive Signs/Symptoms)  Outcome: Ongoing (interventions implemented as appropriate)

## 2019-08-06 NOTE — CONSULTS
CHIEF COMPLAINT/REASON FOR VISIT:  Suicidal Ideation    HPI:  Patient presented to our ED with the above complaint on August 5 at almost 8 PM.  He reported suicidal ideation with a plan dose on his pills.  To behavioral health unit staff he reported friction with his live-in girlfriend.  He was admitted to this unit in early June of this year for similar symptoms.  This morning he is laughing inappropriately and is cooperative.    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   8/4/2019 at Unknown time   • venlafaxine (EFFEXOR) 50 MG tablet Take 150 mg by mouth Daily.   8/5/2019 at Unknown time   • diclofenac (VOLTAREN) 50 MG EC tablet Take 1 tablet by mouth 3 (Three) Times a Day. 21 tablet 0 Unknown at Unknown time       ALLERGIES:  Adderall [amphetamine-dextroamphetamine]; Penicillins; and Benadryl [diphenhydramine hcl (sleep)]      PAST MEDICAL/SURGICAL HISTORY:  Past Medical History:   Diagnosis Date   • ADHD (attention deficit hyperactivity disorder)    • Anxiety    • Depression    • PTSD (post-traumatic stress disorder)        History reviewed. No pertinent surgical history.    Review of Systems   Constitutional: Negative for activity change, appetite change, fatigue and fever.   HENT: Negative for congestion, ear discharge, ear pain, facial swelling, hearing loss, nosebleeds, postnasal drip, rhinorrhea, sinus pressure, sore throat, tinnitus and trouble swallowing.    Eyes: Negative for pain, discharge and visual disturbance.   Respiratory: Negative for cough, shortness of breath and wheezing.   "  Cardiovascular: Negative for chest pain, palpitations and leg swelling.   Gastrointestinal: Positive for vomiting. Negative for abdominal pain, blood in stool, constipation, diarrhea and nausea.   Genitourinary: Negative for difficulty urinating, discharge, dysuria, flank pain, frequency, hematuria, penile pain, penile swelling, scrotal swelling, testicular pain and urgency.   Musculoskeletal: Negative for arthralgias, back pain, joint swelling, myalgias and neck pain.   Skin: Negative for rash and wound.   Neurological: Negative for dizziness, seizures, syncope, weakness, light-headedness and headaches.   Hematological: Negative for adenopathy.       Social History     Socioeconomic History   • Marital status: Single     Spouse name: Not on file   • Number of children: Not on file   • Years of education: Not on file   • Highest education level: Not on file   Tobacco Use   • Smoking status: Current Every Day Smoker     Packs/day: 1.00     Types: Cigarettes   • Smokeless tobacco: Former User   Substance and Sexual Activity   • Alcohol use: No   • Drug use: No   • Sexual activity: Defer       Family History   Problem Relation Age of Onset   • Diabetes Mother    • No Known Problems Father              Objective     /67 (BP Location: Right arm, Patient Position: Sitting)   Pulse 88   Temp 98 °F (36.7 °C) (Tympanic)   Resp 20   Ht 165.1 cm (65\")   Wt 72.5 kg (159 lb 14.4 oz)   SpO2 98%   BMI 26.61 kg/m²     Physical Exam   Constitutional: He appears well-developed and well-nourished.   HENT:   Head: Normocephalic and atraumatic.   Eyes: Conjunctivae and EOM are normal.   Neck: Normal range of motion. Neck supple. No thyromegaly present.   Cardiovascular: Normal rate, regular rhythm and normal heart sounds. Exam reveals no gallop and no friction rub.   No murmur heard.  Pulmonary/Chest: Effort normal and breath sounds normal. No respiratory distress. He has no wheezes. He has no rales.   Abdominal: Soft. He " exhibits no distension and no mass. There is no tenderness. There is no rebound and no guarding.   Musculoskeletal: Normal range of motion.   Lymphadenopathy:     He has no cervical adenopathy.   Neurological: He is alert. He has normal strength. He displays no tremor and normal reflexes. No sensory deficit. He exhibits normal muscle tone. Coordination normal. He displays no Babinski's sign on the right side. He displays no Babinski's sign on the left side.   Reflex Scores:       Tricep reflexes are 2+ on the right side and 2+ on the left side.       Bicep reflexes are 2+ on the right side and 2+ on the left side.       Brachioradialis reflexes are 2+ on the right side and 2+ on the left side.       Patellar reflexes are 2+ on the right side and 2+ on the left side.       Achilles reflexes are 2+ on the right side and 2+ on the left side.  CN II: Visual fields intact  CN III,IV,VI: extraocular movements intact  CN V: [place] strength and sensation in all three divisions intact  CN VII: Smile and eyelid closure symmetrical  CN VIII: Hearing intact  CN IX and X: Voice and palate movement intact  CN XI: Shoulder shrug intact  CN XII: Tongue protrusion and movement intact   Skin: Skin is warm and dry. No rash noted. No erythema.   Very large bruise on the inner aspect of the right arm that is about 8 x 10 cm and appears several days old.  There is no unusual erythema or exudate.  (Patient says this happened when he was helping his girlfriend move.)   Nursing note and vitals reviewed.      Dystonia/Tardive Dyskinesia  Absent  Meningeal Signs  Absent    Diagnostic Studies  CBC, CMP,TSH, UDS, acetaminophen level, salicylate level, ethanol level, U/A all normal except    COMPREHENSIVE METABOLIC PANEL - Abnormal; Notable for the following components:       Result Value      Glucose 120 (*)       All other components within normal limits     Narrative:      GFR Normal >60  Chronic Kidney Disease <60  Kidney Failure <15    URINALYSIS W/ MICROSCOPIC IF INDICATED (NO CULTURE) - Abnormal; Notable for the following components:     Appearance, UA Turbid (*)       Blood, UA Small (1+) (*)       All other components within normal limits   ACETAMINOPHEN LEVEL - Abnormal; Notable for the following components:     Acetaminophen <5.0 (*)       All other components within normal limits   CBC WITH AUTO DIFFERENTIAL - Abnormal; Notable for the following components:     MPV 12.4 (*)       Lymphocytes, Absolute 3.93 (*)       Monocytes, Absolute 0.96 (*)       All other components within normal limits   URINALYSIS, MICROSCOPIC ONLY - Abnormal; Notable for the following components:     RBC, UA 3-5 (*)       All other components within normal limits   SALICYLATE LEVEL - Normal   URINE DRUG SCREEN - Normal   Ethanol less than 10    EKG done on August 6 shows:  Vent. Rate : 069 BPM     Atrial Rate : 069 BPM     P-R Int : 134 ms          QRS Dur : 098 ms      QT Int : 366 ms       P-R-T Axes : 047 081 062 degrees     QTc Int : 392 ms    Normal sinus rhythm with sinus arrhythmia  Normal ECG  When compared with ECG of 06-JUN-2019 06:44,  No significant change was found      UCG  CXR Nl except  EKG Nl except    Assessment/Plan     Past Medical History:   Diagnosis Date   • ADHD (attention deficit hyperactivity disorder)    • Anxiety    • Depression    • PTSD (post-traumatic stress disorder)      From outpatient notes:     ADHD  PTSD  Seizure disorder  Mentally deficient  Fetal alcohol syndrome  Disabled  Depression  Cutting disorder     The hematuria is apparently chronic from the outpatient notes and suggest no further evaluation at this time.     Hyperglycemia, significant.  It has been increased in the past and I do not see a result for hemoglobin A1c and will check that today.      Continue Home Meds as ordered. Mental health and pain issues managed per psychiatry.  Further diagnostic studies or intervention based on hospital course.
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
## 2019-08-06 NOTE — ED PROVIDER NOTES
Subjective   22yo male pmh significant mdd/seizure/ptsd presents ED c/o suicidal ideation with plan for intentional overdose.  Pt denies HI/AV hallucination/etoh/illicit/overdose.  ROS (+) nausea, vomiting.        History provided by:  Patient  Mental Health Problem   Presenting symptoms: depression, suicidal thoughts and suicidal threats    Presenting symptoms: no hallucinations and no homicidal ideas    Degree of incapacity (severity):  Severe  Chronicity:  Recurrent  Associated symptoms: no abdominal pain        Review of Systems   Unable to perform ROS: Psychiatric disorder   Constitutional: Negative.    HENT: Negative.    Respiratory: Negative.    Cardiovascular: Negative.    Gastrointestinal: Positive for nausea and vomiting. Negative for abdominal pain.   Psychiatric/Behavioral: Positive for dysphoric mood and suicidal ideas. Negative for hallucinations and homicidal ideas.       Past Medical History:   Diagnosis Date   • Anxiety    • Depression        Allergies   Allergen Reactions   • Adderall [Amphetamine-Dextroamphetamine] Other (See Comments)     Pt states its redness all over    • Penicillins Other (See Comments)     Pt doesn't know the exact side affect    • Benadryl [Diphenhydramine Hcl (Sleep)] Rash       History reviewed. No pertinent surgical history.    Family History   Problem Relation Age of Onset   • Diabetes Mother    • No Known Problems Father        Social History     Socioeconomic History   • Marital status: Single     Spouse name: Not on file   • Number of children: Not on file   • Years of education: Not on file   • Highest education level: Not on file   Tobacco Use   • Smoking status: Current Every Day Smoker     Packs/day: 1.00     Types: Cigarettes   • Smokeless tobacco: Former User   Substance and Sexual Activity   • Alcohol use: No   • Drug use: No           Objective   Physical Exam   Constitutional: He is oriented to person, place, and time. He appears well-developed and  well-nourished.   HENT:   Head: Normocephalic and atraumatic.   Mouth/Throat: Oropharynx is clear and moist.   Eyes: Pupils are equal, round, and reactive to light.   Neck: Normal range of motion. Neck supple.   Cardiovascular: Normal rate, regular rhythm, normal heart sounds and intact distal pulses. Exam reveals no gallop and no friction rub.   No murmur heard.  Pulmonary/Chest: Effort normal and breath sounds normal. He has no wheezes. He has no rales.   Abdominal: Soft. Bowel sounds are normal. He exhibits no distension and no mass. There is no tenderness. There is no rebound and no guarding.   Musculoskeletal: He exhibits no edema.   Neurological: He is alert and oriented to person, place, and time.   Skin: Skin is warm and dry.   Psychiatric: His speech is normal. He is withdrawn. He is not actively hallucinating. Thought content is not paranoid. Cognition and memory are normal. He expresses inappropriate judgment. He exhibits a depressed mood. He expresses suicidal ideation. He expresses no homicidal ideation. He expresses suicidal plans.   Nursing note and vitals reviewed.      Procedures           ED Course  ED Course as of Aug 05 2230   Mon Aug 05, 2019   2229 Pt evaluated by [facility] psych et accepted for admission.  [SD]      ED Course User Index  [SD] Fadi Nelson MD      Labs Reviewed   COMPREHENSIVE METABOLIC PANEL - Abnormal; Notable for the following components:       Result Value    Glucose 120 (*)     All other components within normal limits    Narrative:     GFR Normal >60  Chronic Kidney Disease <60  Kidney Failure <15   URINALYSIS W/ MICROSCOPIC IF INDICATED (NO CULTURE) - Abnormal; Notable for the following components:    Appearance, UA Turbid (*)     Blood, UA Small (1+) (*)     All other components within normal limits   ACETAMINOPHEN LEVEL - Abnormal; Notable for the following components:    Acetaminophen <5.0 (*)     All other components within normal limits   CBC WITH AUTO DIFFERENTIAL -  Abnormal; Notable for the following components:    MPV 12.4 (*)     Lymphocytes, Absolute 3.93 (*)     Monocytes, Absolute 0.96 (*)     All other components within normal limits   URINALYSIS, MICROSCOPIC ONLY - Abnormal; Notable for the following components:    RBC, UA 3-5 (*)     All other components within normal limits   SALICYLATE LEVEL - Normal   URINE DRUG SCREEN - Normal    Narrative:     Cutoff For Drugs Screened:    Amphetamines               500 ng/ml  Barbiturates               200 ng/ml  Benzodiazepines            150 ng/ml  Cocaine                    150 ng/ml  Methadone                  200 ng/ml  Opiates                    100 ng/ml  Phencyclidine               25 ng/ml  THC                            50 ng/ml  Methamphetamine            500 ng/ml  Tricyclic Antidepressants  300 ng/ml  Oxycodone                  100 ng/ml  Propoxyphene               300 ng/ml  Buprenorphine               10 ng/ml    The normal value for all drugs tested is negative. This report includes unconfirmed screening results, with the cutoff values listed, to be used for medical treatment purposes only.  Unconfirmed results must not be used for non-medical purposes such as employment or legal testing.  Clinical consideration should be applied to any drug of abuse test, particularly when unconfirmed results are used.     ETHANOL   CBC AND DIFFERENTIAL    Narrative:     The following orders were created for panel order CBC & Differential.  Procedure                               Abnormality         Status                     ---------                               -----------         ------                     Scan Slide[[id number]]                                                                  CBC Auto Differential[[id number]]        Abnormal            Final result                 Please view results for these tests on the individual orders.   EXTRA TUBES    Narrative:     The following orders were created for panel order  Extra Tubes.  Procedure                               Abnormality         Status                     ---------                               -----------         ------                     Light Blue Top[[id number]]                                   Final result               Gold Top - SST[[id number]]                                   Final result               Gold Top - SST[[id number]]                                   Final result                 Please view results for these tests on the individual orders.   LIGHT BLUE TOP   GOLD TOP - SST   GOLD TOP - SST                 [facility]      Final diagnoses:   Depression with suicidal ideation            Fadi Nelson MD  08/05/19 6753
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
## 2019-08-06 NOTE — ED NOTES
"Pt presents to ED with suicidal thoughts. Pt reports depression and anxiety. Pt is currently out of medications but is normally compliant with medications. Previous suicide attempts in the past. Pt states \"Do not let me go home tonight because I will take a bottle of pills and kill myself.\"     Tara Bruno RN  08/05/19 2047       Tara Bruno RN  08/05/19 2102    "

## 2019-08-06 NOTE — PLAN OF CARE
Problem: Patient Care Overview  Goal: Plan of Care Review  Outcome: Ongoing (interventions implemented as appropriate)   08/05/19 3602   Coping/Psychosocial   Plan of Care Reviewed With patient   Plan of Care Review   Progress no change     Goal: Individualization and Mutuality  Outcome: Ongoing (interventions implemented as appropriate)    Goal: Discharge Needs Assessment  Outcome: Ongoing (interventions implemented as appropriate)    Goal: Interprofessional Rounds/Family Conf  Outcome: Ongoing (interventions implemented as appropriate)

## 2019-08-06 NOTE — ED NOTES
Called [facility] for evaluation      Yoli Woody RN  08/05/19 9038

## 2019-08-06 NOTE — NURSING NOTE
Patient admitted to [facility]. Escorted onto unit via security and ED tech. Patient wanded for contraband. None found. Patient placed in paper scrubs and all consents signed.

## 2019-08-06 NOTE — ED NOTES
Patient placed in yellow gown, yellow socks provided, sitter outside of patient room and patient is stable.     Addie Cantor RN  08/05/19 2072

## 2019-08-07 VITALS
HEIGHT: 65 IN | HEART RATE: 97 BPM | DIASTOLIC BLOOD PRESSURE: 56 MMHG | WEIGHT: 159.9 LBS | RESPIRATION RATE: 18 BRPM | BODY MASS INDEX: 26.64 KG/M2 | SYSTOLIC BLOOD PRESSURE: 116 MMHG | OXYGEN SATURATION: 97 % | TEMPERATURE: 98.3 F

## 2019-08-07 LAB
25(OH)D3 SERPL-MCNC: 32 NG/ML (ref 30–100)
VIT B12 BLD-MCNC: 220 PG/ML (ref 211–946)

## 2019-08-07 PROCEDURE — 99232 SBSQ HOSP IP/OBS MODERATE 35: CPT | Performed by: NURSE PRACTITIONER

## 2019-08-07 RX ADMIN — NICOTINE POLACRILEX 2 MG: 2 GUM, CHEWING BUCCAL at 13:06

## 2019-08-07 RX ADMIN — BUPROPION HYDROCHLORIDE 150 MG: 150 TABLET, FILM COATED, EXTENDED RELEASE ORAL at 08:29

## 2019-08-07 RX ADMIN — TRAZODONE HYDROCHLORIDE 100 MG: 100 TABLET ORAL at 21:27

## 2019-08-07 RX ADMIN — NICOTINE POLACRILEX 2 MG: 2 GUM, CHEWING BUCCAL at 17:41

## 2019-08-07 RX ADMIN — NICOTINE POLACRILEX 2 MG: 2 GUM, CHEWING BUCCAL at 15:35

## 2019-08-07 RX ADMIN — HYDROXYZINE PAMOATE 50 MG: 50 CAPSULE ORAL at 08:29

## 2019-08-07 RX ADMIN — NICOTINE POLACRILEX 2 MG: 2 GUM, CHEWING BUCCAL at 10:19

## 2019-08-07 RX ADMIN — GUANFACINE HYDROCHLORIDE 1 MG: 1 TABLET ORAL at 21:27

## 2019-08-07 NOTE — PROGRESS NOTES
"Met with patient and completed recreation therapy assessment.  Patient states he used to enjoy fighting, now he wants to be a better person.  He states that he likes to hang out with his family.  He also states that he likes \"being a player and trying to meet girls\".  He states that exercise helps him to cope with stress.  Patient will be encouraged to participate in groups and identify ways to problem solve, as well a practice affective coping skills.  "

## 2019-08-07 NOTE — NURSING NOTE
Behavior   Note any precipitants to event or behavior   Describe level and action of any aggressive behavior or speech and associated interventions.     Anxiety: Patient denies at this time  Depression: Patient denies at this time  Pain  0  AVH   no  S/I   no  Plan  no  H/I   no  Plan  no    Affect   inappropriate      Note: Patient up in hallway during assessment. Denies anxiety, depression, AVH, S/I, and H/I. Patient noted to be making inappropriate/flirtacious comments to staff despite setting limits and verbal redirection. Took medications as ordered. Interacting with peers often. Patient needs met. Will continue to monitor.      Intervention    PRN medication utilized:  no    Instructed in medication usage and effects  Medications administered as ordered  Encouraged to verbalize needs      Response    Verbalized understanding   Did patient take medications as ordered yes   Did patient interact with assessment?  yes     Plan    Will monitor for safety  Will monitor every 15 minutes as ordered  Will evaluate and promote the plan of care    Last BM:  unknown date  (Please chart in I/O as well)

## 2019-08-07 NOTE — PLAN OF CARE
Problem: Overarching Goals (Adult)  Goal: Adheres to Safety Considerations for Self and Others  Outcome: Ongoing (interventions implemented as appropriate)   08/06/19 2223   Overarching Goals (Adult)   Adheres to Safety Considerations for Self and Others making progress toward outcome     Goal: Optimized Coping Skills in Response to Life Stressors  Outcome: Ongoing (interventions implemented as appropriate)   08/06/19 2223   Overarching Goals (Adult)   Optimized Coping Skills in Response to Life Stressors making progress toward outcome     Goal: Develops/Participates in Therapeutic [place] to Support Successful Transition  Outcome: Ongoing (interventions implemented as appropriate)   08/06/19 2223   Overarching Goals (Adult)   Develops/Participates in Therapeutic [place] to Support Successful Transition making progress toward outcome       Problem: Suicidal Behavior (Adult)  Goal: Suicidal Behavior is Absent/Minimized/Managed  Outcome: Ongoing (interventions implemented as appropriate)   08/06/19 2223   Suicidal Behavior is Absent/Minimized/Managed   Suicidal Behavior Managed/Minimized Action Step (STG) Outcome making progress toward outcome       Problem: Mood Impairment (Depressive Signs/Symptoms) (Adult)  Goal: Improved Mood Symptoms (Depressive Signs/Symptoms)  Outcome: Ongoing (interventions implemented as appropriate)   08/06/19 2223   Improved Mood Symptoms (Depressive Signs/Symptoms)   Improved Mood Symptoms Action Step (STG) Outcome making progress toward outcome       Problem: Feelings of Worthlessness, Hopelessness, Excessive Guilt (Depressive Signs/Symptoms) (Adult)  Goal: Enhanced Self-Esteem/Confidence (Depressive Signs/Symptoms)  Outcome: Ongoing (interventions implemented as appropriate)   08/06/19 2223   Enhanced Self-Esteem/Confidence (Depressive Signs/Symptoms)   Enhanced Self-Esteem/Confidence Action Step (STG) Outcome making progress toward outcome

## 2019-08-07 NOTE — PLAN OF CARE
Problem: Patient Care Overview  Goal: Plan of Care Review  Outcome: Ongoing (interventions implemented as appropriate)   08/07/19 0944   Coping/Psychosocial   Plan of Care Reviewed With patient   Plan of Care Review   Progress improving   Coping/Psychosocial   Patient Agreement with Plan of Care agrees     Goal: Individualization and Mutuality  Outcome: Ongoing (interventions implemented as appropriate)    Goal: Discharge Needs Assessment  Outcome: Ongoing (interventions implemented as appropriate)    Goal: Interprofessional Rounds/Family Conf  Outcome: Ongoing (interventions implemented as appropriate)      Problem: Overarching Goals (Adult)  Goal: Adheres to Safety Considerations for Self and Others  Outcome: Ongoing (interventions implemented as appropriate)    Goal: Optimized Coping Skills in Response to Life Stressors  Outcome: Ongoing (interventions implemented as appropriate)    Goal: Develops/Participates in Therapeutic [place] to Support Successful Transition  Outcome: Ongoing (interventions implemented as appropriate)      Problem: Suicidal Behavior (Adult)  Goal: Suicidal Behavior is Absent/Minimized/Managed  Outcome: Ongoing (interventions implemented as appropriate)      Problem: Mood Impairment (Depressive Signs/Symptoms) (Adult)  Goal: Improved Mood Symptoms (Depressive Signs/Symptoms)  Outcome: Ongoing (interventions implemented as appropriate)      Problem: Feelings of Worthlessness, Hopelessness, Excessive Guilt (Depressive Signs/Symptoms) (Adult)  Goal: Enhanced Self-Esteem/Confidence (Depressive Signs/Symptoms)  Outcome: Ongoing (interventions implemented as appropriate)

## 2019-08-07 NOTE — PLAN OF CARE
Problem: Patient Care Overview  Goal: Plan of Care Review  Outcome: Ongoing (interventions implemented as appropriate)    Goal: Individualization and Mutuality  Outcome: Ongoing (interventions implemented as appropriate)    Goal: Discharge Needs Assessment  Outcome: Ongoing (interventions implemented as appropriate)    Goal: Interprofessional Rounds/Family Conf  Outcome: Ongoing (interventions implemented as appropriate)      Problem: Overarching Goals (Adult)  Goal: Adheres to Safety Considerations for Self and Others  Outcome: Ongoing (interventions implemented as appropriate)    Goal: Optimized Coping Skills in Response to Life Stressors  Outcome: Ongoing (interventions implemented as appropriate)    Goal: Develops/Participates in Therapeutic [place] to Support Successful Transition  Outcome: Ongoing (interventions implemented as appropriate)      Problem: Suicidal Behavior (Adult)  Goal: Suicidal Behavior is Absent/Minimized/Managed  Outcome: Ongoing (interventions implemented as appropriate)      Problem: Mood Impairment (Depressive Signs/Symptoms) (Adult)  Goal: Improved Mood Symptoms (Depressive Signs/Symptoms)  Outcome: Ongoing (interventions implemented as appropriate)      Problem: Feelings of Worthlessness, Hopelessness, Excessive Guilt (Depressive Signs/Symptoms) (Adult)  Goal: Enhanced Self-Esteem/Confidence (Depressive Signs/Symptoms)  Outcome: Ongoing (interventions implemented as appropriate)

## 2019-08-07 NOTE — NURSING NOTE
Behavior   Note any precipitants to event or behavior   Describe level and action of any aggressive behavior or speech and associated interventions.     Anxiety: Restless/Edgy  Depression: Patient denies at this time  Pain  0  AVH   no  S/I   no  Plan  no  H/I   no  Plan  no    Affect   euthymic/normal      Note: Pt cooperative this am. He received vistaril for stated anxiety. No specific trigger for anxiety stated. Pt did not display any behaviors this morning, cooperative with care      Intervention    PRN medication utilized:  yes - vistaril for anxiety    Instructed in medication usage and effects  Medications administered as ordered  Encouraged to verbalize needs      Response    Verbalized understanding   Did patient take medications as ordered yes   Did patient interact with assessment?  yes     Plan    Will monitor for safety  Will monitor every 15 minutes as ordered  Will evaluate and promote the plan of care    Last BM:  unknown date    (Please chart in I/O as well)

## 2019-08-07 NOTE — PLAN OF CARE
Problem: Overarching Goals (Adult)  Goal: Optimized Coping Skills in Response to Life Stressors    Intervention: Promote Effective Coping Strategies   08/07/19 6479   Coping/Psychosocial Interventions   Supportive Measures positive reinforcement provided;self-responsibility promoted;problem solving facilitated;goal setting facilitated

## 2019-08-07 NOTE — PROGRESS NOTES
8/7/2019    Chief Complaint: suicidal ideation and anxiety    Subjective:    Mr. Tyrel Gonsalez is a 23 yr old male on the adult U. Today he is seen in treatment team and in the common area with peers. He is alert/oriented, he is able to make his needs known. He reports being upset last night over feeling disrespected by the staff.  He says that he is proud of how he handled the situation because he typically more impulsive.   Tyrel states that his anxiety is high and he reports that he hasn't been on medications in a little while, but that he felt high from Effexor, he reports that Wellbutrin is better for him.   He does not report any GI Distress    Objective     Vital Signs    Temp:  [97.3 °F (36.3 °C)-98 °F (36.7 °C)] 97.3 °F (36.3 °C)  Heart Rate:  [] 97  Resp:  [18-20] 18  BP: (125-134)/(58) 125/58    Physical Exam:   General Appearance: alert, appears stated age and cooperative,  Hygiene:   fair  Gait & Station: Normal  Musculoskeletal: No tremors or abnormal involuntary movements    Mental Status Exam:   Cooperation:  Cooperative  Eye Contact:  Fair  Psychomotor Behavior:  Hyperactive  Mood: Improving  Affect:  normal  Speech:  Rapid  Thought Process:  Coherent  Associations: Goal Directed and difficulty staying topic  Thought Content:     Normal   Suicidal:  None   Homicidal:  None   Hallucinations:  None   Delusion:  None  Cognitive Functioning:   Consciousness: awake and alert  Reliability:  fair  Insight:  improving  Judgement:  Impaired  Impulse Control:  Impaired    Lab Results (last 24 hours)     Procedure Component Value Units Date/Time    Vitamin B12 [[id number]]  (Normal) Collected:  08/05/19 1955    Specimen:  Blood Updated:  08/07/19 0116     Vitamin B-12 220 pg/mL     Vitamin D 25 Hydroxy [[id number]]  (Normal) Collected:  08/05/19 1955    Specimen:  Blood Updated:  08/07/19 0116     25 Hydroxy, Vitamin D 32.0 ng/ml     Narrative:       Reference Range for Total Vitamin D 25(OH)      Deficiency <20.0 ng/mL   Insufficiency 21-29 ng/mL   Sufficiency  ng/mL  Toxicity >100 ng/ml        Imaging Results (last 24 hours)     ** No results found for the last 24 hours. **          Medicine:   Current Facility-Administered Medications:   •  acetaminophen (TYLENOL) tablet 650 mg, 650 mg, Oral, Q4H PRN, Kaiser Ovalle MD  •  aluminum-magnesium hydroxide-simethicone (MAALOX MAX) 400-400-40 MG/5ML suspension 15 mL, 15 mL, Oral, Q6H PRN, Kaiser Ovalle MD, 15 mL at 08/06/19 0302  •  buPROPion XL (WELLBUTRIN XL) 24 hr tablet 150 mg, 150 mg, Oral, Daily, Kaiser Ovalle MD, 150 mg at 08/07/19 0829  •  CloNIDine (CATAPRES) tablet 0.1 mg, 0.1 mg, Oral, Q4H PRN, Kaiser Ovalle MD  •  guanFACINE (TENEX) tablet 1 mg, 1 mg, Oral, Nightly, Kaiser Ovalle MD, 1 mg at 08/06/19 2014  •  hydrOXYzine pamoate (VISTARIL) capsule 50 mg, 50 mg, Oral, Q6H PRN, Kaiser Ovalle MD, 50 mg at 08/07/19 0829  •  loperamide (IMODIUM) capsule 2 mg, 2 mg, Oral, Q2H PRN, Kaiser Ovalle MD  •  magnesium hydroxide (MILK OF MAGNESIA) suspension 2400 mg/10mL 10 mL, 10 mL, Oral, Daily PRN, Kaiser Ovalle MD  •  nicotine polacrilex (NICORETTE) gum 2 mg, 2 mg, Mouth/Throat, Q2H PRN, Kaiser Ovalle MD, 2 mg at 08/07/19 1306  •  traZODone (DESYREL) tablet 100 mg, 100 mg, Oral, Nightly, Kaiser Ovalle MD, 100 mg at 08/06/19 2014    Diagnoses/Assessment:     Severe episode of recurrent major depressive disorder, without psychotic features (CMS/HCC)    Impulse control disorder    Suicidal ideation      Treatment Plan:    1) Will continue care for the patient on the behavioral health unit at [facility] to ensure patient safety.  2) Will continue to provide treatment with the unit milieu, activities, therapies and psychopharmacological management.  3) Patient to be placed on or continued on  Q15 minute checks  and Suicide precautions.  4) Pertinent medical issues: none  5) Will  order following labs: none  6) Will make the following medication changes:   --Cont Wellbutrin 150 mg daily  -_Cont Tenex 1 mg nightly  --Cont Trazodone 100 mg nightly  7) Will continue discharge planning as appropriate for patient.  8) Psychotherapy provided for less than 16 minutes.    Treatment plan and medication risks and benefits discussed with: Patient    Mirta Roman, KE  08/07/19  2:38 PM

## 2019-08-08 PROBLEM — R45.851 SUICIDAL IDEATION: Status: RESOLVED | Noted: 2019-08-05 | Resolved: 2019-08-08

## 2019-08-08 PROCEDURE — 99238 HOSP IP/OBS DSCHRG MGMT 30/<: CPT | Performed by: PSYCHIATRY & NEUROLOGY

## 2019-08-08 RX ORDER — BUPROPION HYDROCHLORIDE 150 MG/1
150 TABLET ORAL DAILY
Qty: 30 TABLET | Refills: 0 | OUTPATIENT
Start: 2019-08-09 | End: 2020-02-18

## 2019-08-08 RX ORDER — GUANFACINE 1 MG/1
1 TABLET ORAL NIGHTLY
Qty: 30 TABLET | Refills: 0 | OUTPATIENT
Start: 2019-08-08 | End: 2020-02-18

## 2019-08-08 RX ORDER — TRAZODONE HYDROCHLORIDE 100 MG/1
100 TABLET ORAL NIGHTLY
Qty: 30 TABLET | Refills: 0 | OUTPATIENT
Start: 2019-08-08 | End: 2020-02-18

## 2019-08-08 RX ADMIN — NICOTINE POLACRILEX 2 MG: 2 GUM, CHEWING BUCCAL at 08:44

## 2019-08-08 RX ADMIN — NICOTINE POLACRILEX 2 MG: 2 GUM, CHEWING BUCCAL at 15:07

## 2019-08-08 RX ADMIN — NICOTINE POLACRILEX 2 MG: 2 GUM, CHEWING BUCCAL at 13:13

## 2019-08-08 RX ADMIN — BUPROPION HYDROCHLORIDE 150 MG: 150 TABLET, FILM COATED, EXTENDED RELEASE ORAL at 08:37

## 2019-08-08 NOTE — NURSING NOTE
"Behavior   Note any precipitants to event or behavior   Describe level and action of any aggressive behavior or speech and associated interventions.     Anxiety: Patient denies at this time  Depression: Patient denies at this time  Pain  0  AVH   no  S/I   no  Plan  no  H/I   no  Plan  no    Affect   euthymic/normal      Note: pt awake on the unit this morning, interacting with staff/peers.  Good eye contact is noted.  \"Who are you?  You are pretty.  How you doing?\" -- when signee introduced self.    No needs/concerns were voiced.  Did mention that the night-shift nurses are \"pieces of shit that are just here to collect a check.  They don't give a damn about me, him, or her!\"    When asked why pt felt that way, replied \"I asked for water and was told I didn't need any water.  I had to wait forever before I got water to drink.  Same thing happened with two other people.\"    Pt has been on the unit laughing and interacting appropriately with peers/staff.  Encouraged him to participate in groups/activities and to come to staff with needs/concerns.    Will continue to monitor.      Intervention    PRN medication utilized:  no    Instructed in medication usage and effects  Medications administered as ordered  Encouraged to verbalize needs      Response    Verbalized understanding   Did patient take medications as ordered yes  Did patient interact with assessment?  yes     Plan    Will monitor for safety  Will monitor every 15 minutes as ordered  Will evaluate and promote the plan of care    Last BM:  unknown date  (Please chart in I/O as well)    "
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
## 2019-08-08 NOTE — PLAN OF CARE
Problem: Overarching Goals (Adult)  Goal: Adheres to Safety Considerations for Self and Others  Outcome: Ongoing (interventions implemented as appropriate)    Goal: Optimized Coping Skills in Response to Life Stressors  Outcome: Ongoing (interventions implemented as appropriate)    Goal: Develops/Participates in Therapeutic [place] to Support Successful Transition  Outcome: Ongoing (interventions implemented as appropriate)      Problem: Suicidal Behavior (Adult)  Goal: Suicidal Behavior is Absent/Minimized/Managed  Outcome: Ongoing (interventions implemented as appropriate)      Problem: Mood Impairment (Depressive Signs/Symptoms) (Adult)  Goal: Improved Mood Symptoms (Depressive Signs/Symptoms)  Outcome: Ongoing (interventions implemented as appropriate)      Problem: Feelings of Worthlessness, Hopelessness, Excessive Guilt (Depressive Signs/Symptoms) (Adult)  Goal: Enhanced Self-Esteem/Confidence (Depressive Signs/Symptoms)  Outcome: Ongoing (interventions implemented as appropriate)

## 2019-08-08 NOTE — NURSING NOTE
Behavior   Note any precipitants to event or behavior   Describe level and action of any aggressive behavior or speech and associated interventions.     Anxiety: Patient denies at this time  Depression: Patient denies at this time  Pain  0  AVH   no  S/I   no  Plan  no  H/I   no  Plan  no    Affect   euthymic/normal      Note: Pt is calm and with appropriate behavior for situations. Denies pain or distress. Will continue to monitor.      Intervention    PRN medication utilized:  no    Instructed in medication usage and effects  Medications administered as ordered  Encouraged to verbalize needs      Response    Verbalized understanding   Did patient take medications as ordered yes   Did patient interact with assessment?  yes     Plan    Will monitor for safety  Will monitor every 15 minutes as ordered  Will evaluate and promote the plan of care    Last BM:  unknown date  (Please chart in I/O as well)

## 2019-08-08 NOTE — PLAN OF CARE
Problem: Overarching Goals (Adult)  Goal: Develops/Participates in Therapeutic [place] to Support Successful Transition    Intervention: Mutually Develop Transition Plan  LCSW met with pt 1:1 and reviewed safety/dc plan. Pt presented to the dayroom, casually dressed, alert and oriented x3. Mood is good, affect is bright. Pt makes good eye contact, speech is normal. Pt is pleasant and cooperative, engages well in conversation. Pt has been observed interacting well with peers and staff on the unit. Pt has had some moments of irritability, however, has exhibited good impulse control. Pt has participated well in individual and group tx, has been med compliant. Pt's plan is to return home and transition to outpatient treatment. LCSW scheduled pt with follow up at [facility]. Pt educated on Crisis Hotline, advised to call as needed. Pt does appear to be at baseline, stable to dc home. BPRS completed upon dc.      08/08/19 1316   Mutually Develop Transition Plan   Transition Support community resources reviewed;follow-up care discussed;crisis management plan promoted;follow-up care coordinated;crisis management plan verbalized

## 2019-08-08 NOTE — PLAN OF CARE
Problem: Patient Care Overview  Goal: Plan of Care Review  Outcome: Ongoing (interventions implemented as appropriate)    Goal: Individualization and Mutuality  Outcome: Ongoing (interventions implemented as appropriate)    Goal: Discharge Needs Assessment  Outcome: Ongoing (interventions implemented as appropriate)    Goal: Interprofessional Rounds/Family Conf  Outcome: Ongoing (interventions implemented as appropriate)      Problem: Overarching Goals (Adult)  Goal: Adheres to Safety Considerations for Self and Others  Outcome: Ongoing (interventions implemented as appropriate)    Goal: Optimized Coping Skills in Response to Life Stressors  Outcome: Ongoing (interventions implemented as appropriate)    Goal: Develops/Participates in Therapeutic [place] to Support Successful Transition  Outcome: Ongoing (interventions implemented as appropriate)      Problem: Suicidal Behavior (Adult)  Goal: Suicidal Behavior is Absent/Minimized/Managed  Outcome: Ongoing (interventions implemented as appropriate)      Problem: Mood Impairment (Depressive Signs/Symptoms) (Adult)  Goal: Improved Mood Symptoms (Depressive Signs/Symptoms)  Outcome: Ongoing (interventions implemented as appropriate)      Problem: Feelings of Worthlessness, Hopelessness, Excessive Guilt (Depressive Signs/Symptoms) (Adult)  Goal: Enhanced Self-Esteem/Confidence (Depressive Signs/Symptoms)  Outcome: Ongoing (interventions implemented as appropriate)

## 2019-08-08 NOTE — NURSING NOTE
Pt discharged home at this time.  All personal items were retrieved from security and sent home with pt, as well as items from his room.  Pt alert & oriented.  Discharged per self with plans to walk home.  Prescriptions and follow-up appointments were discussed and paperwork was given to patient prior to departure.

## 2019-08-12 ENCOUNTER — HOSPITAL ENCOUNTER (EMERGENCY)
Facility: HOSPITAL | Age: 24
Discharge: HOME | End: 2019-08-12
Attending: EMERGENCY MEDICINE | Admitting: EMERGENCY MEDICINE

## 2019-08-12 VITALS
DIASTOLIC BLOOD PRESSURE: 60 MMHG | OXYGEN SATURATION: 98 % | TEMPERATURE: 98.6 F | WEIGHT: 160 LBS | HEIGHT: 65 IN | HEART RATE: 91 BPM | SYSTOLIC BLOOD PRESSURE: 116 MMHG | BODY MASS INDEX: 26.66 KG/M2 | RESPIRATION RATE: 18 BRPM

## 2019-08-12 VITALS
SYSTOLIC BLOOD PRESSURE: 117 MMHG | OXYGEN SATURATION: 98 % | DIASTOLIC BLOOD PRESSURE: 67 MMHG | WEIGHT: 160 LBS | HEIGHT: 65 IN | HEART RATE: 110 BPM | TEMPERATURE: 98.3 F | RESPIRATION RATE: 20 BRPM | BODY MASS INDEX: 26.66 KG/M2

## 2019-08-12 DIAGNOSIS — L02.31 ABSCESS OF LEFT BUTTOCK: Primary | ICD-10-CM

## 2019-08-12 PROCEDURE — 99283 EMERGENCY DEPT VISIT LOW MDM: CPT

## 2019-08-12 PROCEDURE — 96372 THER/PROPH/DIAG INJ SC/IM: CPT

## 2019-08-12 PROCEDURE — 99282 EMERGENCY DEPT VISIT SF MDM: CPT

## 2019-08-12 RX ORDER — MUPIROCIN CALCIUM 20 MG/G
CREAM TOPICAL 3 TIMES DAILY
Qty: 15 G | Refills: 0 | Status: SHIPPED | OUTPATIENT
Start: 2019-08-12 | End: 2019-08-19

## 2019-08-12 RX ORDER — SULFAMETHOXAZOLE AND TRIMETHOPRIM 800; 160 MG/1; MG/1
1 TABLET ORAL 2 TIMES DAILY
Qty: 20 TABLET | Refills: 0 | OUTPATIENT
Start: 2019-08-12 | End: 2019-08-13

## 2019-08-12 NOTE — ED PROVIDER NOTES
"Subjective   22yo male pmh significant mdd presents ED c/o 2d hx left buttock pain.  Pt reports \"feels like a pimple.\"  ROS neg fever/chills/nausea/vomiting.        History provided by:  Patient  Rash   Location:  Pelvis  Pelvic rash location:  L buttock  Quality: painful    Pain details:     Duration:  2 days      Review of Systems   Constitutional: Negative.    HENT: Negative.    Respiratory: Negative.    Cardiovascular: Negative.    Gastrointestinal: Negative.    Skin: Positive for rash.       Past Medical History:   Diagnosis Date   • ADHD (attention deficit hyperactivity disorder)    • Anxiety    • Depression    • PTSD (post-traumatic stress disorder)        Allergies   Allergen Reactions   • Adderall [Amphetamine-Dextroamphetamine] Other (See Comments)     Pt states its redness all over    • Penicillins Other (See Comments)     Pt doesn't know the exact side affect    • Benadryl [Diphenhydramine Hcl (Sleep)] Rash       History reviewed. No pertinent surgical history.    Family History   Problem Relation Age of Onset   • Diabetes Mother    • Alcohol abuse Mother    • No Known Problems Father    • Mental illness Paternal Uncle    • Alcohol abuse Paternal Uncle    • Alcohol abuse Paternal Uncle    • Suicide Attempts Neg Hx        Social History     Socioeconomic History   • Marital status: Single     Spouse name: Not on file   • Number of children: Not on file   • Years of education: Not on file   • Highest education level: Not on file   Tobacco Use   • Smoking status: Current Every Day Smoker     Packs/day: 1.00     Types: Cigarettes   • Smokeless tobacco: Former User   Substance and Sexual Activity   • Alcohol use: No   • Drug use: No   • Sexual activity: Defer           Objective   Physical Exam   Constitutional: He appears well-developed and well-nourished.   HENT:   Head: Normocephalic and atraumatic.   Eyes: Pupils are equal, round, and reactive to light.   Neck: Normal range of motion. Neck supple. No JVD " present. No tracheal deviation present.   Cardiovascular: Normal rate, regular rhythm, normal heart sounds and intact distal pulses. Exam reveals no gallop and no friction rub.   No murmur heard.  Pulmonary/Chest: Effort normal and breath sounds normal. He has no wheezes. He has no rales.   Abdominal: Soft. Bowel sounds are normal. He exhibits no distension. There is no tenderness.   Lymphadenopathy:     He has no cervical adenopathy.   Skin:        Nursing note and vitals reviewed.      Procedures           ED Course                  MDM      Final diagnoses:   Abscess of left buttock            Fadi Nelson MD  08/12/19 0729

## 2019-08-13 ENCOUNTER — HOSPITAL ENCOUNTER (EMERGENCY)
Facility: HOSPITAL | Age: 24
Discharge: HOME | End: 2019-08-13
Attending: EMERGENCY MEDICINE | Admitting: EMERGENCY MEDICINE

## 2019-08-13 DIAGNOSIS — L03.317 CELLULITIS OF BUTTOCK: Primary | ICD-10-CM

## 2019-08-13 PROCEDURE — 25010000002 KETOROLAC TROMETHAMINE PER 15 MG: Performed by: EMERGENCY MEDICINE

## 2019-08-13 PROCEDURE — 96372 THER/PROPH/DIAG INJ SC/IM: CPT

## 2019-08-13 RX ORDER — CLINDAMYCIN PHOSPHATE 150 MG/ML
600 INJECTION, SOLUTION INTRAVENOUS ONCE
Status: COMPLETED | OUTPATIENT
Start: 2019-08-13 | End: 2019-08-13

## 2019-08-13 RX ORDER — CLINDAMYCIN HYDROCHLORIDE 300 MG/1
300 CAPSULE ORAL 3 TIMES DAILY
Qty: 30 CAPSULE | Refills: 0 | Status: SHIPPED | OUTPATIENT
Start: 2019-08-13 | End: 2019-08-23

## 2019-08-13 RX ORDER — KETOROLAC TROMETHAMINE 30 MG/ML
60 INJECTION, SOLUTION INTRAMUSCULAR; INTRAVENOUS ONCE
Status: COMPLETED | OUTPATIENT
Start: 2019-08-13 | End: 2019-08-13

## 2019-08-13 RX ADMIN — CLINDAMYCIN PHOSPHATE 600 MG: 150 INJECTION, SOLUTION INTRAMUSCULAR; INTRAVENOUS at 01:33

## 2019-08-13 RX ADMIN — KETOROLAC TROMETHAMINE 60 MG: 60 INJECTION, SOLUTION INTRAMUSCULAR at 01:33

## 2019-08-13 NOTE — ED NOTES
"Pt states he was evaluated here this am, but did not get his prescriptions filled because he doesn't drive. Pt subsequently states he walked here this evening. Advised of proximity of pharmacies. Pt then states he isn't from here and didn't know. Pt requests wound be \"lanced\" to make it feel better. Pt also states that wound has been draining purulent drainage and is painful to sit. Explained to pt that MD would be in to evaluate, but pt needs to make sure he does as MD ordered to get better.     Erica Granado, RN  08/13/19 0026    "

## 2019-08-13 NOTE — ED PROVIDER NOTES
Subjective   23-year-old white male presents to the emergency department with chief complaint of abscess.  Patient complains of an abscess of the left buttocks for 2 days.  He was seen here yesterday morning prescribed Bactrim and Bactroban cream but he did not fill his prescriptions.  He returns to the emergency department requesting that we balaji it.  Patient denies fever or chills.            Review of Systems   Constitutional: Negative for chills, diaphoresis and fever.   Respiratory: Negative for shortness of breath.    Cardiovascular: Negative for chest pain.   Gastrointestinal: Positive for diarrhea. Negative for abdominal pain, nausea and vomiting.   Genitourinary: Negative for dysuria.   Musculoskeletal: Negative for back pain, gait problem, neck pain and neck stiffness.   Skin: Positive for rash.   Neurological: Negative for syncope, weakness and headaches.   All other systems reviewed and are negative.      Past Medical History:   Diagnosis Date   • ADHD (attention deficit hyperactivity disorder)    • Anxiety    • Depression    • PTSD (post-traumatic stress disorder)        Allergies   Allergen Reactions   • Adderall [Amphetamine-Dextroamphetamine] Other (See Comments)     Pt states its redness all over    • Penicillins Other (See Comments)     Pt doesn't know the exact side affect    • Benadryl [Diphenhydramine Hcl (Sleep)] Rash       History reviewed. No pertinent surgical history.    Family History   Problem Relation Age of Onset   • Diabetes Mother    • Alcohol abuse Mother    • No Known Problems Father    • Mental illness Paternal Uncle    • Alcohol abuse Paternal Uncle    • Alcohol abuse Paternal Uncle    • Suicide Attempts Neg Hx        Social History     Socioeconomic History   • Marital status: Single     Spouse name: Not on file   • Number of children: Not on file   • Years of education: Not on file   • Highest education level: Not on file   Tobacco Use   • Smoking status: Current Every Day  Smoker     Packs/day: 1.00     Types: Cigarettes   • Smokeless tobacco: Former User   Substance and Sexual Activity   • Alcohol use: No   • Drug use: No   • Sexual activity: Defer           Objective   Physical Exam   Constitutional: He is oriented to person, place, and time. He appears well-developed and well-nourished. No distress.   HENT:   Head: Normocephalic and atraumatic.   Right Ear: External ear normal.   Left Ear: External ear normal.   Nose: Nose normal.   Mouth/Throat: Oropharynx is clear and moist.   Eyes: Conjunctivae and EOM are normal. Pupils are equal, round, and reactive to light.   Neck: Normal range of motion. Neck supple.   Cardiovascular: Normal rate, regular rhythm, normal heart sounds and intact distal pulses.   Pulmonary/Chest: Effort normal and breath sounds normal.   Abdominal: Soft. Bowel sounds are normal. He exhibits no distension and no mass. There is no tenderness. There is no guarding.   Musculoskeletal: Normal range of motion. He exhibits no edema or tenderness.   Neurological: He is alert and oriented to person, place, and time. No sensory deficit. He exhibits normal muscle tone.   Skin: Skin is warm and dry. He is not diaphoretic.   Small tender erythematous indurated papule left buttocks.  No fluctuance.   Psychiatric: He has a normal mood and affect. His behavior is normal.   Nursing note and vitals reviewed.      Procedures           ED Course  ED Course as of Aug 13 0111   Tue Aug 13, 2019   0108 Patient requested that we balaji the buttocks abscess.  Informed consent was obtained from the patient after risks and benefits were discussed.  Patient declined local anesthetic.  Skin was prepped with Betadine then attempted needle aspiration with an 18-gauge sterile needle.  No purulent drainage.  Minimal bleeding.  No complications.  Patient tolerated procedure well.  [DR]      ED Course User Index  [DR] Alfredo Aguilar MD                  [facility]      Final diagnoses:   Cellulitis of  Alfredo Cortes MD  08/13/19 0118

## 2019-08-29 ENCOUNTER — HOSPITAL ENCOUNTER (EMERGENCY)
Facility: HOSPITAL | Age: 24
Discharge: PSYCHIATRIC OR SUBSTANCE USE FACILITY | End: 2019-08-29
Attending: FAMILY MEDICINE | Admitting: FAMILY MEDICINE

## 2019-08-29 VITALS
TEMPERATURE: 96.3 F | DIASTOLIC BLOOD PRESSURE: 77 MMHG | HEART RATE: 95 BPM | SYSTOLIC BLOOD PRESSURE: 121 MMHG | OXYGEN SATURATION: 99 % | RESPIRATION RATE: 18 BRPM

## 2019-08-29 DIAGNOSIS — R45.851 SUICIDAL IDEATION: Primary | ICD-10-CM

## 2019-08-29 LAB
ALBUMIN SERPL-MCNC: 4.3 G/DL (ref 3.5–5.2)
ALBUMIN/GLOB SERPL: 1.2 G/DL
ALP SERPL-CCNC: 115 U/L (ref 39–117)
ALT SERPL W P-5'-P-CCNC: 46 U/L (ref 1–41)
AMPHET+METHAMPHET UR QL: NEGATIVE
AMPHETAMINES UR QL: NEGATIVE
ANION GAP SERPL CALCULATED.3IONS-SCNC: 13 MMOL/L (ref 5–15)
APAP SERPL-MCNC: <5 MCG/ML (ref 10–30)
AST SERPL-CCNC: 25 U/L (ref 1–40)
BACTERIA UR QL AUTO: ABNORMAL /HPF
BARBITURATES UR QL SCN: NEGATIVE
BASOPHILS # BLD AUTO: 0.04 10*3/MM3 (ref 0–0.2)
BASOPHILS NFR BLD AUTO: 0.6 % (ref 0–1.5)
BENZODIAZ UR QL SCN: NEGATIVE
BILIRUB SERPL-MCNC: 0.6 MG/DL (ref 0.2–1.2)
BILIRUB UR QL STRIP: NEGATIVE
BUN BLD-MCNC: 11 MG/DL (ref 6–20)
BUN/CREAT SERPL: 11 (ref 7–25)
BUPRENORPHINE SERPL-MCNC: NEGATIVE NG/ML
CALCIUM SPEC-SCNC: 9.3 MG/DL (ref 8.6–10.5)
CANNABINOIDS SERPL QL: NEGATIVE
CHLORIDE SERPL-SCNC: 102 MMOL/L (ref 98–107)
CLARITY UR: CLEAR
CO2 SERPL-SCNC: 24 MMOL/L (ref 22–29)
COCAINE UR QL: NEGATIVE
COD CRY URNS QL: ABNORMAL /HPF
COLOR UR: ABNORMAL
CREAT BLD-MCNC: 1 MG/DL (ref 0.76–1.27)
DEPRECATED RDW RBC AUTO: 37.2 FL (ref 37–54)
EOSINOPHIL # BLD AUTO: 0.21 10*3/MM3 (ref 0–0.4)
EOSINOPHIL NFR BLD AUTO: 3 % (ref 0.3–6.2)
ERYTHROCYTE [DISTWIDTH] IN BLOOD BY AUTOMATED COUNT: 12.4 % (ref 12.3–15.4)
ETHANOL BLD-MCNC: <10 MG/DL (ref 0–10)
ETHANOL UR QL: <0.01 %
GFR SERPL CREATININE-BSD FRML MDRD: 93 ML/MIN/1.73
GLOBULIN UR ELPH-MCNC: 3.6 GM/DL
GLUCOSE BLD-MCNC: 92 MG/DL (ref 65–99)
GLUCOSE UR STRIP-MCNC: NEGATIVE MG/DL
HCT VFR BLD AUTO: 47.6 % (ref 37.5–51)
HGB BLD-MCNC: 15.9 G/DL (ref 13–17.7)
HGB UR QL STRIP.AUTO: ABNORMAL
HOLD SPECIMEN: NORMAL
HYALINE CASTS UR QL AUTO: ABNORMAL /LPF
IMM GRANULOCYTES # BLD AUTO: 0.02 10*3/MM3 (ref 0–0.05)
IMM GRANULOCYTES NFR BLD AUTO: 0.3 % (ref 0–0.5)
KETONES UR QL STRIP: ABNORMAL
LEUKOCYTE ESTERASE UR QL STRIP.AUTO: NEGATIVE
LYMPHOCYTES # BLD AUTO: 2.86 10*3/MM3 (ref 0.7–3.1)
LYMPHOCYTES NFR BLD AUTO: 40.9 % (ref 19.6–45.3)
MCH RBC QN AUTO: 27.6 PG (ref 26.6–33)
MCHC RBC AUTO-ENTMCNC: 33.4 G/DL (ref 31.5–35.7)
MCV RBC AUTO: 82.6 FL (ref 79–97)
METHADONE UR QL SCN: NEGATIVE
MONOCYTES # BLD AUTO: 0.71 10*3/MM3 (ref 0.1–0.9)
MONOCYTES NFR BLD AUTO: 10.2 % (ref 5–12)
MUCOUS THREADS URNS QL MICRO: ABNORMAL /HPF
NEUTROPHILS # BLD AUTO: 3.15 10*3/MM3 (ref 1.7–7)
NEUTROPHILS NFR BLD AUTO: 45 % (ref 42.7–76)
NITRITE UR QL STRIP: NEGATIVE
NRBC BLD AUTO-RTO: 0 /100 WBC (ref 0–0.2)
OPIATES UR QL: NEGATIVE
OXYCODONE UR QL SCN: NEGATIVE
PCP UR QL SCN: NEGATIVE
PH UR STRIP.AUTO: 6 [PH] (ref 5–9)
PLATELET # BLD AUTO: 164 10*3/MM3 (ref 140–450)
PMV BLD AUTO: 12.4 FL (ref 6–12)
POTASSIUM BLD-SCNC: 4.4 MMOL/L (ref 3.5–5.2)
PROPOXYPH UR QL: NEGATIVE
PROT SERPL-MCNC: 7.9 G/DL (ref 6–8.5)
PROT UR QL STRIP: ABNORMAL
RBC # BLD AUTO: 5.76 10*6/MM3 (ref 4.14–5.8)
RBC # UR: ABNORMAL /HPF
REF LAB TEST METHOD: ABNORMAL
SALICYLATES SERPL-MCNC: <0.3 MG/DL
SODIUM BLD-SCNC: 139 MMOL/L (ref 136–145)
SP GR UR STRIP: 1.03 (ref 1–1.03)
SQUAMOUS #/AREA URNS HPF: ABNORMAL /HPF
TRICYCLICS UR QL SCN: NEGATIVE
TSH SERPL DL<=0.05 MIU/L-ACNC: 1.82 UIU/ML (ref 0.27–4.2)
UROBILINOGEN UR QL STRIP: ABNORMAL
WBC NRBC COR # BLD: 6.99 10*3/MM3 (ref 3.4–10.8)
WBC UR QL AUTO: ABNORMAL /HPF
WHOLE BLOOD HOLD SPECIMEN: NORMAL

## 2019-08-29 PROCEDURE — 80307 DRUG TEST PRSMV CHEM ANLYZR: CPT | Performed by: PHYSICIAN ASSISTANT

## 2019-08-29 PROCEDURE — 81001 URINALYSIS AUTO W/SCOPE: CPT | Performed by: PHYSICIAN ASSISTANT

## 2019-08-29 PROCEDURE — 80306 DRUG TEST PRSMV INSTRMNT: CPT | Performed by: PHYSICIAN ASSISTANT

## 2019-08-29 PROCEDURE — 87661 TRICHOMONAS VAGINALIS AMPLIF: CPT | Performed by: PHYSICIAN ASSISTANT

## 2019-08-29 PROCEDURE — 87491 CHLMYD TRACH DNA AMP PROBE: CPT | Performed by: PHYSICIAN ASSISTANT

## 2019-08-29 PROCEDURE — 80053 COMPREHEN METABOLIC PANEL: CPT | Performed by: PHYSICIAN ASSISTANT

## 2019-08-29 PROCEDURE — 84443 ASSAY THYROID STIM HORMONE: CPT | Performed by: PHYSICIAN ASSISTANT

## 2019-08-29 PROCEDURE — 85025 COMPLETE CBC W/AUTO DIFF WBC: CPT | Performed by: PHYSICIAN ASSISTANT

## 2019-08-29 PROCEDURE — 87086 URINE CULTURE/COLONY COUNT: CPT | Performed by: PHYSICIAN ASSISTANT

## 2019-08-29 PROCEDURE — 87591 N.GONORRHOEAE DNA AMP PROB: CPT | Performed by: PHYSICIAN ASSISTANT

## 2019-08-29 PROCEDURE — 99284 EMERGENCY DEPT VISIT MOD MDM: CPT

## 2019-08-29 RX ORDER — SODIUM CHLORIDE 0.9 % (FLUSH) 0.9 %
10 SYRINGE (ML) INJECTION AS NEEDED
Status: DISCONTINUED | OUTPATIENT
Start: 2019-08-29 | End: 2019-08-29 | Stop reason: HOSPADM

## 2019-08-29 RX ADMIN — SODIUM CHLORIDE 1000 ML: 900 INJECTION, SOLUTION INTRAVENOUS at 13:09

## 2019-08-30 LAB
BACTERIA SPEC AEROBE CULT: NO GROWTH
C TRACH RRNA CVX QL NAA+PROBE: NEGATIVE
N GONORRHOEA RRNA SPEC QL NAA+PROBE: NEGATIVE

## 2019-10-03 ENCOUNTER — HOSPITAL ENCOUNTER (EMERGENCY)
Facility: HOSPITAL | Age: 24
Discharge: ANOTHER HOSPITAL | End: 2019-10-04
Attending: EMERGENCY MEDICINE | Admitting: EMERGENCY MEDICINE

## 2019-10-03 DIAGNOSIS — R45.851 SUICIDAL IDEATION: Primary | ICD-10-CM

## 2019-10-03 DIAGNOSIS — R31.9 HEMATURIA, UNSPECIFIED TYPE: ICD-10-CM

## 2019-10-03 LAB
ALBUMIN SERPL-MCNC: 4.7 G/DL (ref 3.5–5.2)
ALBUMIN/GLOB SERPL: 1.3 G/DL
ALP SERPL-CCNC: 106 U/L (ref 39–117)
ALT SERPL W P-5'-P-CCNC: 29 U/L (ref 1–41)
AMPHET+METHAMPHET UR QL: NEGATIVE
AMPHETAMINES UR QL: NEGATIVE
ANION GAP SERPL CALCULATED.3IONS-SCNC: 13 MMOL/L (ref 5–15)
APAP SERPL-MCNC: <5 MCG/ML (ref 10–30)
AST SERPL-CCNC: 19 U/L (ref 1–40)
BACTERIA UR QL AUTO: ABNORMAL /HPF
BARBITURATES UR QL SCN: NEGATIVE
BASOPHILS # BLD AUTO: 0.03 10*3/MM3 (ref 0–0.2)
BASOPHILS NFR BLD AUTO: 0.3 % (ref 0–1.5)
BENZODIAZ UR QL SCN: NEGATIVE
BILIRUB SERPL-MCNC: 1.2 MG/DL (ref 0.2–1.2)
BILIRUB UR QL STRIP: ABNORMAL
BUN BLD-MCNC: 11 MG/DL (ref 6–20)
BUN/CREAT SERPL: 12.1 (ref 7–25)
BUPRENORPHINE SERPL-MCNC: NEGATIVE NG/ML
CALCIUM SPEC-SCNC: 9.1 MG/DL (ref 8.6–10.5)
CANNABINOIDS SERPL QL: NEGATIVE
CHLORIDE SERPL-SCNC: 105 MMOL/L (ref 98–107)
CLARITY UR: CLEAR
CO2 SERPL-SCNC: 22 MMOL/L (ref 22–29)
COCAINE UR QL: NEGATIVE
COLOR UR: ABNORMAL
CREAT BLD-MCNC: 0.91 MG/DL (ref 0.76–1.27)
DEPRECATED RDW RBC AUTO: 36.4 FL (ref 37–54)
EOSINOPHIL # BLD AUTO: 0.22 10*3/MM3 (ref 0–0.4)
EOSINOPHIL NFR BLD AUTO: 2.4 % (ref 0.3–6.2)
ERYTHROCYTE [DISTWIDTH] IN BLOOD BY AUTOMATED COUNT: 12.3 % (ref 12.3–15.4)
ETHANOL BLD-MCNC: <10 MG/DL (ref 0–10)
ETHANOL UR QL: <0.01 %
GFR SERPL CREATININE-BSD FRML MDRD: 103 ML/MIN/1.73
GLOBULIN UR ELPH-MCNC: 3.6 GM/DL
GLUCOSE BLD-MCNC: 96 MG/DL (ref 65–99)
GLUCOSE UR STRIP-MCNC: NEGATIVE MG/DL
HCT VFR BLD AUTO: 45.2 % (ref 37.5–51)
HGB BLD-MCNC: 15.6 G/DL (ref 13–17.7)
HGB UR QL STRIP.AUTO: ABNORMAL
HOLD SPECIMEN: NORMAL
HYALINE CASTS UR QL AUTO: ABNORMAL /LPF
IMM GRANULOCYTES # BLD AUTO: 0.02 10*3/MM3 (ref 0–0.05)
IMM GRANULOCYTES NFR BLD AUTO: 0.2 % (ref 0–0.5)
KETONES UR QL STRIP: ABNORMAL
LEUKOCYTE ESTERASE UR QL STRIP.AUTO: NEGATIVE
LYMPHOCYTES # BLD AUTO: 4.01 10*3/MM3 (ref 0.7–3.1)
LYMPHOCYTES NFR BLD AUTO: 43.7 % (ref 19.6–45.3)
MCH RBC QN AUTO: 28.1 PG (ref 26.6–33)
MCHC RBC AUTO-ENTMCNC: 34.5 G/DL (ref 31.5–35.7)
MCV RBC AUTO: 81.4 FL (ref 79–97)
METHADONE UR QL SCN: NEGATIVE
MONOCYTES # BLD AUTO: 0.62 10*3/MM3 (ref 0.1–0.9)
MONOCYTES NFR BLD AUTO: 6.8 % (ref 5–12)
NEUTROPHILS # BLD AUTO: 4.28 10*3/MM3 (ref 1.7–7)
NEUTROPHILS NFR BLD AUTO: 46.6 % (ref 42.7–76)
NITRITE UR QL STRIP: NEGATIVE
NRBC BLD AUTO-RTO: 0 /100 WBC (ref 0–0.2)
OPIATES UR QL: NEGATIVE
OXYCODONE UR QL SCN: NEGATIVE
PCP UR QL SCN: NEGATIVE
PH UR STRIP.AUTO: 6 [PH] (ref 5–9)
PLAT MORPH BLD: NORMAL
PLATELET # BLD AUTO: 196 10*3/MM3 (ref 140–450)
PMV BLD AUTO: 13.1 FL (ref 6–12)
POTASSIUM BLD-SCNC: 3.8 MMOL/L (ref 3.5–5.2)
PROPOXYPH UR QL: NEGATIVE
PROT SERPL-MCNC: 8.3 G/DL (ref 6–8.5)
PROT UR QL STRIP: ABNORMAL
RBC # BLD AUTO: 5.55 10*6/MM3 (ref 4.14–5.8)
RBC # UR: ABNORMAL /HPF
RBC MORPH BLD: NORMAL
REF LAB TEST METHOD: ABNORMAL
SALICYLATES SERPL-MCNC: <0.3 MG/DL
SODIUM BLD-SCNC: 140 MMOL/L (ref 136–145)
SP GR UR STRIP: 1.03 (ref 1–1.03)
SQUAMOUS #/AREA URNS HPF: ABNORMAL /HPF
TRICYCLICS UR QL SCN: NEGATIVE
UROBILINOGEN UR QL STRIP: ABNORMAL
WBC MORPH BLD: NORMAL
WBC NRBC COR # BLD: 9.18 10*3/MM3 (ref 3.4–10.8)
WBC UR QL AUTO: ABNORMAL /HPF
WHOLE BLOOD HOLD SPECIMEN: NORMAL

## 2019-10-03 PROCEDURE — 80306 DRUG TEST PRSMV INSTRMNT: CPT

## 2019-10-03 PROCEDURE — 85025 COMPLETE CBC W/AUTO DIFF WBC: CPT

## 2019-10-03 PROCEDURE — 81001 URINALYSIS AUTO W/SCOPE: CPT

## 2019-10-03 PROCEDURE — 80307 DRUG TEST PRSMV CHEM ANLYZR: CPT | Performed by: EMERGENCY MEDICINE

## 2019-10-03 PROCEDURE — 80053 COMPREHEN METABOLIC PANEL: CPT

## 2019-10-03 PROCEDURE — 85007 BL SMEAR W/DIFF WBC COUNT: CPT

## 2019-10-03 PROCEDURE — 99285 EMERGENCY DEPT VISIT HI MDM: CPT

## 2019-10-04 ENCOUNTER — APPOINTMENT (OUTPATIENT)
Dept: CT IMAGING | Facility: HOSPITAL | Age: 24
End: 2019-10-04

## 2019-10-04 VITALS
BODY MASS INDEX: 25.71 KG/M2 | RESPIRATION RATE: 18 BRPM | HEIGHT: 66 IN | TEMPERATURE: 98 F | DIASTOLIC BLOOD PRESSURE: 58 MMHG | WEIGHT: 160 LBS | HEART RATE: 79 BPM | SYSTOLIC BLOOD PRESSURE: 109 MMHG | OXYGEN SATURATION: 99 %

## 2019-10-04 PROCEDURE — 74176 CT ABD & PELVIS W/O CONTRAST: CPT

## 2019-10-04 NOTE — ED PROVIDER NOTES
"Subjective   24-year-old white male presents to the emergency department with chief complaint of suicidal ideation.  Patient complains of suicidal thoughts with plan \"to cut my wrists open.\"            Review of Systems   Constitutional: Negative for chills, diaphoresis and fever.   Respiratory: Positive for cough. Negative for shortness of breath.    Cardiovascular: Negative for chest pain and leg swelling.   Gastrointestinal: Negative for abdominal pain, nausea and vomiting.   Genitourinary: Positive for hematuria. Negative for dysuria.   Musculoskeletal: Positive for back pain. Negative for gait problem.   Neurological: Negative for syncope, weakness and headaches.   Psychiatric/Behavioral: Positive for suicidal ideas.   All other systems reviewed and are negative.      Past Medical History:   Diagnosis Date   • ADHD (attention deficit hyperactivity disorder)    • Anxiety    • Depression    • PTSD (post-traumatic stress disorder)        Allergies   Allergen Reactions   • Adderall [Amphetamine-Dextroamphetamine] Other (See Comments)     Pt states its redness all over    • Penicillins Other (See Comments)     Pt doesn't know the exact side affect    • Benadryl [Diphenhydramine Hcl (Sleep)] Rash       History reviewed. No pertinent surgical history.    Family History   Problem Relation Age of Onset   • Diabetes Mother    • Alcohol abuse Mother    • No Known Problems Father    • Mental illness Paternal Uncle    • Alcohol abuse Paternal Uncle    • Alcohol abuse Paternal Uncle    • Suicide Attempts Neg Hx        Social History     Socioeconomic History   • Marital status: Single     Spouse name: Not on file   • Number of children: Not on file   • Years of education: Not on file   • Highest education level: Not on file   Tobacco Use   • Smoking status: Current Every Day Smoker     Packs/day: 1.00     Types: Cigarettes   • Smokeless tobacco: Former User   Substance and Sexual Activity   • Alcohol use: No   • Drug use: No "   • Sexual activity: Defer           Objective   Physical Exam   Constitutional: He is oriented to person, place, and time. He appears well-developed and well-nourished. No distress.   HENT:   Head: Normocephalic and atraumatic.   Right Ear: External ear normal.   Left Ear: External ear normal.   Nose: Nose normal.   Mouth/Throat: Oropharynx is clear and moist.   Eyes: Conjunctivae and EOM are normal. Pupils are equal, round, and reactive to light.   Neck: Normal range of motion. Neck supple.   Cardiovascular: Normal rate, regular rhythm, normal heart sounds and intact distal pulses.   Pulmonary/Chest: Effort normal and breath sounds normal.   Abdominal: Soft. Bowel sounds are normal. He exhibits no distension and no mass. There is no tenderness. There is no guarding.   Musculoskeletal: Normal range of motion. He exhibits no edema or tenderness.   Neurological: He is alert and oriented to person, place, and time. No cranial nerve deficit or sensory deficit. He exhibits normal muscle tone.   Skin: Skin is warm and dry. He is not diaphoretic.   Psychiatric:   Depressed mood with suicidal ideation.   Nursing note and vitals reviewed.      Procedures           ED Course  ED Course as of Oct 04 0556   Fri Oct 04, 2019   0500 Jaquelin from [facility] has evaluated the patient and recommends transfer to [facility].  []   0553 Case discussed with psychiatrist on-call Dr. Montes and she accepts the patient for transfer to [facility].  [DR]      ED Course User Index  [DR] Alfredo Aguilar MD      Labs Reviewed   URINALYSIS W/ CULTURE IF INDICATED - Abnormal; Notable for the following components:       Result Value    Specific Gravity, UA 1.035 (*)     Ketones, UA Trace (*)     Bilirubin, UA Small (1+) (*)     Blood, UA Small (1+) (*)     Protein, UA 30 mg/dL (1+) (*)     All other components within normal limits   CBC WITH AUTO DIFFERENTIAL - Abnormal; Notable for the following components:    RDW-SD 36.4 (*)     MPV  13.1 (*)     Lymphocytes, Absolute 4.01 (*)     All other components within normal limits   URINALYSIS, MICROSCOPIC ONLY - Abnormal; Notable for the following components:    RBC, UA 13-20 (*)     All other components within normal limits   ACETAMINOPHEN LEVEL - Abnormal; Notable for the following components:    Acetaminophen <5.0 (*)     All other components within normal limits   URINE DRUG SCREEN - Normal    Narrative:     Cutoff For Drugs Screened:    Amphetamines               500 ng/ml  Barbiturates               200 ng/ml  Benzodiazepines            150 ng/ml  Cocaine                    150 ng/ml  Methadone                  200 ng/ml  Opiates                    100 ng/ml  Phencyclidine               25 ng/ml  THC                            50 ng/ml  Methamphetamine            500 ng/ml  Tricyclic Antidepressants  300 ng/ml  Oxycodone                  100 ng/ml  Propoxyphene               300 ng/ml  Buprenorphine               10 ng/ml    The normal value for all drugs tested is negative. This report includes unconfirmed screening results, with the cutoff values listed, to be used for medical treatment purposes only.  Unconfirmed results must not be used for non-medical purposes such as employment or legal testing.  Clinical consideration should be applied to any drug of abuse test, particularly when unconfirmed results are used.     SCAN SLIDE - Normal   SALICYLATE LEVEL - Normal   COMPREHENSIVE METABOLIC PANEL    Narrative:     GFR Normal >60  Chronic Kidney Disease <60  Kidney Failure <15   ETHANOL   CBC AND DIFFERENTIAL    Narrative:     The following orders were created for panel order CBC & Differential.  Procedure                               Abnormality         Status                     ---------                               -----------         ------                     CBC Auto Differential[[id number]]        Abnormal            Final result                 Please view results for these tests on the  individual orders.   EXTRA TUBES    Narrative:     The following orders were created for panel order Extra Tubes.  Procedure                               Abnormality         Status                     ---------                               -----------         ------                     Light Blue Top[[id number]]                                   Final result               Gold Top - SST[[id number]]                                   Final result                 Please view results for these tests on the individual orders.   LIGHT BLUE TOP   GOLD [facility]     Ct Abdomen Pelvis Without Contrast    Result Date: 10/4/2019  Narrative: PROCEDURE:  CT ABDOMEN PELVIS WO CONTRAST CLINICAL HISTORY:  24 years  Male Hematuria. TECHNIQUE: Contiguous axial images obtained through the abdomen and pelvis without IV contrast.  Coronal and sagittal reformatted images provided.  This CT exam was performed according to our departmental dose-optimization program, which includes one or more of the following dose reduction techniques: automated exposure control, adjustment of the mA and/or kV according to patient size, and/or use of iterative reconstruction technique. COMPARISON:  No prior exams provided for comparison. FINDINGS: There are no renal, ureteral, or bladder calculi. There is no hydronephrosis or perinephric stranding on either side. The appendix is normal. There is no bowel inflammation, obstruction, free intraperitoneal air, or ascites. The lung bases, unenhanced liver, biliary tree, gallbladder, pancreas, spleen, adrenal glands, kidneys, urinary bladder, and osseous structures are normal.     Impression: No urolithiasis or appendicitis. No acute abdominal or pelvic findings. Electronically signed by:  Stella Ko MD  10/4/2019 1:13 AM CDT Workstation: [id number]              [facility]    Final diagnoses:   Suicidal ideation   Hematuria, unspecified type              Alfredo Aguilar MD  10/04/19 0557

## 2019-10-04 NOTE — ED NOTES
Received TC from Kailee with [employer]. Updated on pt status.     Erica Granado RN  10/04/19 9410

## 2019-10-04 NOTE — ED NOTES
[facility] will be going to [facility]. Jaquelin requests personal identifier sheet and 710 form. MD informed.     Erica Granado, RN  10/04/19 1761

## 2019-10-04 NOTE — ED NOTES
Pt to conference room for tele-conference evaluation with Magdalena.     Erica Granado, RN  10/04/19 0432

## 2020-02-12 ENCOUNTER — APPOINTMENT (OUTPATIENT)
Dept: GENERAL RADIOLOGY | Age: 25
End: 2020-02-12
Payer: MEDICAID

## 2020-02-12 ENCOUNTER — HOSPITAL ENCOUNTER (EMERGENCY)
Age: 25
Discharge: HOME | End: 2020-02-12
Attending: EMERGENCY MEDICINE
Payer: MEDICAID

## 2020-02-12 ENCOUNTER — APPOINTMENT (OUTPATIENT)
Dept: CT IMAGING | Age: 25
End: 2020-02-12
Payer: MEDICAID

## 2020-02-12 VITALS
TEMPERATURE: 97.9 F | OXYGEN SATURATION: 94 % | SYSTOLIC BLOOD PRESSURE: 93 MMHG | DIASTOLIC BLOOD PRESSURE: 73 MMHG | RESPIRATION RATE: 17 BRPM | HEART RATE: 94 BPM

## 2020-02-12 LAB
ACETAMINOPHEN LEVEL: <15 UG/ML
ALBUMIN SERPL-MCNC: 4.2 G/DL (ref 3.5–5.2)
ALP BLD-CCNC: 85 U/L (ref 40–130)
ALT SERPL-CCNC: 28 U/L (ref 5–41)
AMPHETAMINE SCREEN, URINE: NEGATIVE
ANION GAP SERPL CALCULATED.3IONS-SCNC: 12 MMOL/L (ref 7–19)
AST SERPL-CCNC: 15 U/L (ref 5–40)
BARBITURATE SCREEN URINE: NEGATIVE
BASE EXCESS ARTERIAL: 1.3 MMOL/L (ref -2–2)
BENZODIAZEPINE SCREEN, URINE: NEGATIVE
BILIRUB SERPL-MCNC: 1.3 MG/DL (ref 0.2–1.2)
BILIRUBIN URINE: NEGATIVE
BLOOD, URINE: NEGATIVE
BUN BLDV-MCNC: 10 MG/DL (ref 6–20)
CALCIUM SERPL-MCNC: 8.9 MG/DL (ref 8.6–10)
CANNABINOID SCREEN URINE: NEGATIVE
CARBOXYHEMOGLOBIN ARTERIAL: 3.5 % (ref 0–5)
CHLORIDE BLD-SCNC: 103 MMOL/L (ref 98–111)
CLARITY: CLEAR
CO2: 24 MMOL/L (ref 22–29)
COCAINE METABOLITE SCREEN URINE: NEGATIVE
COLOR: YELLOW
CREAT SERPL-MCNC: 0.8 MG/DL (ref 0.5–1.2)
ETHANOL: <10 MG/DL (ref 0–0.08)
GFR NON-AFRICAN AMERICAN: >60
GLUCOSE BLD-MCNC: 106 MG/DL (ref 74–109)
GLUCOSE URINE: NEGATIVE MG/DL
HCO3 ARTERIAL: 25.9 MMOL/L (ref 22–26)
HCT VFR BLD CALC: 47.6 % (ref 42–52)
HEMOGLOBIN, ART, EXTENDED: 15.8 G/DL (ref 14–18)
HEMOGLOBIN: 15.6 G/DL (ref 14–18)
KETONES, URINE: NEGATIVE MG/DL
LEUKOCYTE ESTERASE, URINE: NEGATIVE
LIPASE: 11 U/L (ref 13–60)
Lab: NORMAL
MCH RBC QN AUTO: 28.3 PG (ref 27–31)
MCHC RBC AUTO-ENTMCNC: 32.8 G/DL (ref 33–37)
MCV RBC AUTO: 86.2 FL (ref 80–94)
METHEMOGLOBIN ARTERIAL: 0.9 %
NITRITE, URINE: NEGATIVE
O2 CONTENT ARTERIAL: 21.2 ML/DL
O2 SAT, ARTERIAL: 95.1 %
O2 THERAPY: ABNORMAL
OPIATE SCREEN URINE: NEGATIVE
PCO2 ARTERIAL: 40 MMHG (ref 35–45)
PDW BLD-RTO: 12.8 % (ref 11.5–14.5)
PH ARTERIAL: 7.42 (ref 7.35–7.45)
PH UA: 7 (ref 5–8)
PLATELET # BLD: 177 K/UL (ref 130–400)
PMV BLD AUTO: 12.8 FL (ref 9.4–12.4)
PO2 ARTERIAL: 104 MMHG (ref 80–100)
POTASSIUM SERPL-SCNC: 3.6 MMOL/L (ref 3.5–5)
POTASSIUM, WHOLE BLOOD: 3.6
PROTEIN UA: NEGATIVE MG/DL
RBC # BLD: 5.52 M/UL (ref 4.7–6.1)
SALICYLATE, SERUM: <3 MG/DL (ref 3–10)
SODIUM BLD-SCNC: 139 MMOL/L (ref 136–145)
SPECIFIC GRAVITY UA: 1.02 (ref 1–1.03)
TOTAL PROTEIN: 7.1 G/DL (ref 6.6–8.7)
TROPONIN: <0.01 NG/ML (ref 0–0.03)
URINE REFLEX TO CULTURE: NORMAL
UROBILINOGEN, URINE: 0.2 E.U./DL
WBC # BLD: 7.7 K/UL (ref 4.8–10.8)

## 2020-02-12 PROCEDURE — 84132 ASSAY OF SERUM POTASSIUM: CPT

## 2020-02-12 PROCEDURE — 80053 COMPREHEN METABOLIC PANEL: CPT

## 2020-02-12 PROCEDURE — 84484 ASSAY OF TROPONIN QUANT: CPT

## 2020-02-12 PROCEDURE — 36600 WITHDRAWAL OF ARTERIAL BLOOD: CPT

## 2020-02-12 PROCEDURE — 81003 URINALYSIS AUTO W/O SCOPE: CPT

## 2020-02-12 PROCEDURE — 85027 COMPLETE CBC AUTOMATED: CPT

## 2020-02-12 PROCEDURE — 80307 DRUG TEST PRSMV CHEM ANLYZR: CPT

## 2020-02-12 PROCEDURE — 93005 ELECTROCARDIOGRAM TRACING: CPT | Performed by: EMERGENCY MEDICINE

## 2020-02-12 PROCEDURE — 83690 ASSAY OF LIPASE: CPT

## 2020-02-12 PROCEDURE — G0480 DRUG TEST DEF 1-7 CLASSES: HCPCS

## 2020-02-12 PROCEDURE — 74022 RADEX COMPL AQT ABD SERIES: CPT

## 2020-02-12 PROCEDURE — 70450 CT HEAD/BRAIN W/O DYE: CPT

## 2020-02-12 PROCEDURE — 99284 EMERGENCY DEPT VISIT MOD MDM: CPT

## 2020-02-12 PROCEDURE — 82803 BLOOD GASES ANY COMBINATION: CPT

## 2020-02-12 PROCEDURE — 36415 COLL VENOUS BLD VENIPUNCTURE: CPT

## 2020-02-12 ASSESSMENT — ENCOUNTER SYMPTOMS
DIARRHEA: 0
SHORTNESS OF BREATH: 0
VOMITING: 0
ABDOMINAL PAIN: 0
EYE PAIN: 0

## 2020-02-12 NOTE — ED NOTES
Patient spontaneous eye opening, in no apparent distress, not answering direct questions     Earl Bradley RN  02/12/20 6745

## 2020-02-12 NOTE — ED PROVIDER NOTES
reviewed. REVIEW OF SYSTEMS    (2-9 systems for level 4, 10 or more for level 5)     Review of Systems   Constitutional: Negative for fever. Eyes: Negative for pain and visual disturbance. Respiratory: Negative for shortness of breath. Cardiovascular: Negative for chest pain, palpitations and leg swelling. Gastrointestinal: Negative for abdominal pain, diarrhea and vomiting. Genitourinary: Negative for dysuria. Skin: Negative for rash. Neurological: Positive for seizures and numbness (left side). Negative for facial asymmetry, speech difficulty, weakness, light-headedness and headaches. All other systems reviewed and are negative. A complete review of systems was performed and is negative except as noted above in the HPI. PAST MEDICAL HISTORY   No past medical history on file. SURGICAL HISTORY     No past surgical history on file. CURRENT MEDICATIONS       There are no discharge medications for this patient. ALLERGIES     Patient has no allergy information on record. FAMILY HISTORY     No family history on file.        SOCIAL HISTORY       Social History     Socioeconomic History    Marital status: Single     Spouse name: Not on file    Number of children: Not on file    Years of education: Not on file    Highest education level: Not on file   Occupational History    Not on file   Social Needs    Financial resource strain: Not on file    Food insecurity:     Worry: Not on file     Inability: Not on file    Transportation needs:     Medical: Not on file     Non-medical: Not on file   Tobacco Use    Smoking status: Not on file   Substance and Sexual Activity    Alcohol use: Not on file    Drug use: Not on file    Sexual activity: Not on file   Lifestyle    Physical activity:     Days per week: Not on file     Minutes per session: Not on file    Stress: Not on file   Relationships    Social connections:     Talks on phone: Not on file     Gets together: Not on file     Attends Jainism service: Not on file     Active member of club or organization: Not on file     Attends meetings of clubs or organizations: Not on file     Relationship status: Not on file    Intimate partner violence:     Fear of current or ex partner: Not on file     Emotionally abused: Not on file     Physically abused: Not on file     Forced sexual activity: Not on file   Other Topics Concern    Not on file   Social History Narrative    Not on file       SCREENINGS             PHYSICAL EXAM    (up to 7 for level 4, 8 or more for level 5)     ED Triage Vitals [02/12/20 1144]   BP Temp Temp Source Pulse Resp SpO2 Height Weight   126/78 97.9 °F (36.6 °C) Oral 94 16 99 % -- --       Physical Exam  Vitals signs reviewed. Constitutional:       General: He is not in acute distress. Appearance: He is well-developed. HENT:      Head: Normocephalic and atraumatic. Right Ear: Tympanic membrane, ear canal and external ear normal.      Left Ear: Tympanic membrane, ear canal and external ear normal.      Mouth/Throat:      Mouth: Mucous membranes are moist.      Pharynx: Oropharynx is clear. Eyes:      General: No scleral icterus. Extraocular Movements: Extraocular movements intact. Pupils: Pupils are equal, round, and reactive to light. Neck:      Musculoskeletal: Normal range of motion and neck supple. No neck rigidity. Vascular: No JVD. Cardiovascular:      Rate and Rhythm: Normal rate and regular rhythm. Pulses: Normal pulses. Heart sounds: Normal heart sounds. No murmur. Pulmonary:      Effort: Pulmonary effort is normal. No respiratory distress. Breath sounds: Normal breath sounds. Abdominal:      General: There is no distension. Palpations: Abdomen is soft. Tenderness: There is no abdominal tenderness. There is no right CVA tenderness, left CVA tenderness, guarding or rebound. Musculoskeletal: Normal range of motion.          General: No calcification just right of midline in the low   pelvis. This would be quite large for a ureteral stone, perhaps simply   a vascular calcification. Correlate for hematuria. Signed by Dr Desean Casiano on 2/12/2020 12:56 PM            ED BEDSIDE ULTRASOUND:   Performed by ED Physician - none    LABS:  Labs Reviewed   BLOOD GAS, ARTERIAL - Abnormal; Notable for the following components:       Result Value    pO2, Arterial 104.0 (*)     All other components within normal limits   CBC - Abnormal; Notable for the following components:    MCHC 32.8 (*)     MPV 12.8 (*)     All other components within normal limits   COMPREHENSIVE METABOLIC PANEL - Abnormal; Notable for the following components: Total Bilirubin 1.3 (*)     All other components within normal limits   LIPASE - Abnormal; Notable for the following components:    Lipase 11 (*)     All other components within normal limits   TROPONIN   ACETAMINOPHEN LEVEL   ETHANOL   URINE DRUG SCREEN   SALICYLATE LEVEL   URINE RT REFLEX TO CULTURE   POTASSIUM, WHOLE BLOOD       All other labs were within normal range or not returned as of this dictation. EMERGENCY DEPARTMENT COURSE and DIFFERENTIALDIAGNOSIS/MDM:   Vitals:    Vitals:    02/12/20 1144 02/12/20 1228 02/12/20 1431   BP: 126/78  93/73   Pulse: 94     Resp: 16 17    Temp: 97.9 °F (36.6 °C)     TempSrc: Oral     SpO2: 99% 92% 94%       MDM  Bilirubin borderline elevated. Patient has no abdominal pain or tenderness. See no indication for abdominal imaging. CT shows evidence of maxillary sinusitis but patient has no symptoms of this so see no indication for treatment for this. Patient resting comfortably no distress. No complaints at this time. Still has some subjective numbness in the left side of his body but tells me it is improving. He is very clear this has been going on for 2 months and been constant and is not new today.   Spoke with on-call neurologist, Dr. Sarahy Briscoe, who said no indication for MRI given that left-sided numbness is only neurologic deficits and has been present for 2 months. He said given this and the fact that symptoms of left-sided numbness have been present for 2 months does not feel that MRI/admit is indicated. Patient will be discharged with seizure precautions and instructed to follow-up with Dr. Albina Pritchard. Discussed other incidental findings on imaging with patient. Told him he needs to follow-up with primary care as well. Discussed seizure precautions with patient. Told him he cannot drive until released by neurologist.  Camila Shape him to return to the ER for change/worsening symptoms or new concerns. Patient agreeable plan. CONSULTS:  None    PROCEDURES:  Unless otherwise notedbelow, none     Procedures    FINAL IMPRESSION     1. Seizure ([address]    2. Left sided numbness    3. Abnormal x-ray          DISPOSITION/PLAN   DISPOSITION Decision To Discharge 02/12/2020 02:20:11 PM      PATIENT REFERRED TO:  @FUP@    DISCHARGE MEDICATIONS:  There are no discharge medications for this patient.          (Please note that portions of this note were completed with a voice recognition program.  Efforts were made to edit the dictations butoccasionally words are mis-transcribed.)    Fredo Lugo MD (electronically signed)  AttendingEmergency Physician        Fredo Lugo MD  02/12/20 5635

## 2020-02-12 NOTE — PROGRESS NOTES
Contains abnormal data Blood Gas, Arterial   Status:  Final result    Ref Range & Units 02/12/20 1226   pH, Arterial 7.350 - 7.450 7.420    pCO2, Arterial 35.0 - 45.0 mmHg 40.0    pO2, Arterial 80.0 - 100.0 mmHg 104. 0High     HCO3, Arterial 22.0 - 26.0 mmol/L 25.9    Base Excess, Arterial -2.0 - 2.0 mmol/L 1.3    Hemoglobin, Art, Extended 14.0 - 18.0 g/dL 15.8    O2 Sat, Arterial >92 % 95.1    Carboxyhgb, Arterial 0.0 - 5.0 % 3.5    Comment:      0.0-1.5   (Smokers 1.5-5.0)    Methemoglobin, Arterial <1.5 % 0.9    O2 Content, Arterial Not Established mL/dL 21.2    O2 Therapy  Unknown    Resulting Agency  [facility]        Specimen Collected: 02/12/20 1226 Last Resulted: 02/12/20 1227 View Other Order Details        Pt on room air, RR22 site RR at+

## 2020-02-13 LAB
EKG P AXIS: 13 DEGREES
EKG P-R INTERVAL: 132 MS
EKG Q-T INTERVAL: 342 MS
EKG QRS DURATION: 96 MS
EKG QTC CALCULATION (BAZETT): 385 MS
EKG T AXIS: 49 DEGREES

## 2020-02-17 ENCOUNTER — HOSPITAL ENCOUNTER (EMERGENCY)
Facility: HOSPITAL | Age: 25
Discharge: HOME | End: 2020-02-18
Attending: EMERGENCY MEDICINE | Admitting: EMERGENCY MEDICINE

## 2020-02-17 DIAGNOSIS — R20.0 NUMBNESS: Primary | ICD-10-CM

## 2020-02-17 PROCEDURE — 99283 EMERGENCY DEPT VISIT LOW MDM: CPT

## 2020-02-18 ENCOUNTER — APPOINTMENT (OUTPATIENT)
Dept: CT IMAGING | Facility: HOSPITAL | Age: 25
End: 2020-02-18

## 2020-02-18 VITALS
OXYGEN SATURATION: 97 % | TEMPERATURE: 97.4 F | DIASTOLIC BLOOD PRESSURE: 62 MMHG | RESPIRATION RATE: 18 BRPM | HEIGHT: 66 IN | WEIGHT: 163.7 LBS | BODY MASS INDEX: 26.31 KG/M2 | HEART RATE: 73 BPM | SYSTOLIC BLOOD PRESSURE: 109 MMHG

## 2020-02-18 PROCEDURE — 70450 CT HEAD/BRAIN W/O DYE: CPT

## 2020-02-18 NOTE — ED PROVIDER NOTES
ARTIFICIAL TEARS to affected eye(s) as needed. Subjective   24-year-old white male with a history of anxiety, depression, ADHD, and PTSD presents to the emergency department with chief complaint of numbness.  Patient complains of numbness on the left side of his body for a couple months.  Patient relates he thinks he had a seizure last week.  Patient was seen at [facility] emergency department in [place] on February 12 with similar symptoms, had a negative work-up and was referred to a neurologist for follow-up.          Review of Systems   Constitutional: Negative for chills, diaphoresis and fever.   Eyes: Negative for visual disturbance.   Respiratory: Negative for shortness of breath.    Cardiovascular: Negative for chest pain.   Gastrointestinal: Negative for abdominal pain, nausea and vomiting.   Genitourinary: Negative for dysuria.   Musculoskeletal: Negative for back pain, gait problem and neck pain.   Neurological: Positive for seizures and numbness. Negative for dizziness, tremors, speech difficulty, weakness and headaches.   Psychiatric/Behavioral: Negative for suicidal ideas.   All other systems reviewed and are negative.      Past Medical History:   Diagnosis Date   • ADHD (attention deficit hyperactivity disorder)    • Anxiety    • Depression    • PTSD (post-traumatic stress disorder)        Allergies   Allergen Reactions   • Adderall [Amphetamine-Dextroamphetamine] Other (See Comments)     Pt states its redness all over    • Penicillins Other (See Comments)     Pt doesn't know the exact side affect    • Benadryl [Diphenhydramine Hcl (Sleep)] Rash       Past Surgical History:   Procedure Laterality Date   • DENTAL PROCEDURE         Family History   Problem Relation Age of Onset   • Diabetes Mother    • Alcohol abuse Mother    • No Known Problems Father    • Mental illness Paternal Uncle    • Alcohol abuse Paternal Uncle    • Alcohol abuse Paternal Uncle    • Suicide Attempts Neg Hx        Social History     Socioeconomic History   • Marital  status: Single     Spouse name: Not on file   • Number of children: Not on file   • Years of education: Not on file   • Highest education level: Not on file   Tobacco Use   • Smoking status: Current Every Day Smoker     Packs/day: 1.00     Types: Cigarettes   • Smokeless tobacco: Former User   Substance and Sexual Activity   • Alcohol use: No   • Drug use: No   • Sexual activity: Defer           Objective   Physical Exam   Constitutional: He is oriented to person, place, and time. He appears well-developed and well-nourished. No distress.   HENT:   Head: Normocephalic and atraumatic.   Right Ear: External ear normal.   Left Ear: External ear normal.   Nose: Nose normal.   Mouth/Throat: Oropharynx is clear and moist.   Eyes: Pupils are equal, round, and reactive to light. Conjunctivae and EOM are normal.   Neck: Normal range of motion. Neck supple.   Cardiovascular: Normal rate, regular rhythm, normal heart sounds and intact distal pulses.   Pulmonary/Chest: Effort normal and breath sounds normal.   Abdominal: Soft. Bowel sounds are normal. He exhibits no distension and no mass. There is no tenderness. There is no guarding.   Musculoskeletal: Normal range of motion. He exhibits no edema or tenderness.   Neurological: He is alert and oriented to person, place, and time. A sensory deficit is present. No cranial nerve deficit. He exhibits normal muscle tone. Coordination normal.   Decreased sensation light touch left side of his face, left upper extremity, and left lower extremity.   Skin: Skin is warm and dry. He is not diaphoretic.   Nursing note and vitals reviewed.      Procedures           ED Course  ED Course as of Feb 18 0115   Tue Feb 18, 2020   0112 Patient is alert and resting comfortably. I reviewed the results of the emergency department evaluation with the patient.  I recommended outpatient MRI, primary care and neurology follow-up.  I advised the patient to return to the emergency department if their  symptoms change or worsen.     [DR]      ED Course User Index  [DR] Alfredo Aguilar MD          Labs Reviewed - No data to display  Ct Head Without Contrast    Result Date: 2/18/2020  Narrative: HISTORY: left sided numbness for 2 months COMPARISON: none TECHNIQUE: Multiple contiguous axial images of the head were obtained.  This exam was performed according to our departmental dose-optimization program, which includes automated exposure control, adjustment of the mA and/or kV according to patient size and/or use of iterative reconstruction technique. CONTRAST: None FINDINGS: Brain parenchyma: There is no evidence of acute intracranial hemorrhage, abnormal mass effect, or major vascular territorial infarct. 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  No extra-axial hemorrhage.  No hydrocephalus. Calvarium and skull base: The calvarium appears intact. Paranasal sinuses and mastoids: There is polypoid mucosal thickening in the bilateral maxillary sinuses, moderate on the right and mild on the left.     Impression: 1.  No acute intracranial abnormality. 2.  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 3.  Bilateral maxillary sinus mucosal thickening without air-fluid level. Electronically signed by:  Reuben Herrera MD  2/18/2020 12:53 AM [facility] Workstation: [id number]                                    [facility]    Final diagnoses:   Numbness            Alfredo Aguilar MD  02/18/20 0115

## 2020-02-18 NOTE — ED NOTES
Pt states he possibly had a seizure one week ago and has had numbness on his left side since then. Pt reports being forgetful at times.       Addie Rodrigez, RN  02/17/20 1907

## 2020-03-05 ENCOUNTER — HOSPITAL ENCOUNTER (EMERGENCY)
Facility: HOSPITAL | Age: 25
Discharge: ANOTHER HOSPITAL | End: 2020-03-05
Admitting: INTERNAL MEDICINE

## 2020-03-05 ENCOUNTER — HOSPITAL ENCOUNTER (EMERGENCY)
Facility: HOSPITAL | Age: 25
End: 2020-03-05

## 2020-03-05 VITALS
HEIGHT: 66 IN | SYSTOLIC BLOOD PRESSURE: 122 MMHG | HEART RATE: 101 BPM | TEMPERATURE: 98.1 F | RESPIRATION RATE: 16 BRPM | WEIGHT: 160 LBS | DIASTOLIC BLOOD PRESSURE: 75 MMHG | OXYGEN SATURATION: 95 % | BODY MASS INDEX: 25.71 KG/M2

## 2020-03-05 VITALS — BODY MASS INDEX: 25.83 KG/M2 | WEIGHT: 160.05 LBS

## 2020-03-05 DIAGNOSIS — R45.851 SUICIDAL IDEATIONS: Primary | ICD-10-CM

## 2020-03-05 LAB
ALBUMIN SERPL-MCNC: 4.6 G/DL (ref 3.5–5.2)
ALBUMIN/GLOB SERPL: 1.4 G/DL
ALP SERPL-CCNC: 108 U/L (ref 39–117)
ALT SERPL W P-5'-P-CCNC: 32 U/L (ref 1–41)
AMPHET+METHAMPHET UR QL: NEGATIVE
AMPHETAMINES UR QL: NEGATIVE
ANION GAP SERPL CALCULATED.3IONS-SCNC: 12 MMOL/L (ref 5–15)
APAP SERPL-MCNC: <5 MCG/ML (ref 10–30)
AST SERPL-CCNC: 18 U/L (ref 1–40)
BACTERIA UR QL AUTO: ABNORMAL /HPF
BARBITURATES UR QL SCN: NEGATIVE
BASOPHILS # BLD AUTO: 0.06 10*3/MM3 (ref 0–0.2)
BASOPHILS NFR BLD AUTO: 0.6 % (ref 0–1.5)
BENZODIAZ UR QL SCN: NEGATIVE
BILIRUB SERPL-MCNC: 0.7 MG/DL (ref 0.2–1.2)
BILIRUB UR QL STRIP: NEGATIVE
BUN BLD-MCNC: 11 MG/DL (ref 6–20)
BUN/CREAT SERPL: 14.1 (ref 7–25)
BUPRENORPHINE SERPL-MCNC: NEGATIVE NG/ML
CALCIUM SPEC-SCNC: 9.2 MG/DL (ref 8.6–10.5)
CANNABINOIDS SERPL QL: NEGATIVE
CHLORIDE SERPL-SCNC: 104 MMOL/L (ref 98–107)
CLARITY UR: CLEAR
CO2 SERPL-SCNC: 24 MMOL/L (ref 22–29)
COCAINE UR QL: NEGATIVE
COLOR UR: YELLOW
CREAT BLD-MCNC: 0.78 MG/DL (ref 0.76–1.27)
DEPRECATED RDW RBC AUTO: 35.8 FL (ref 37–54)
EOSINOPHIL # BLD AUTO: 0.05 10*3/MM3 (ref 0–0.4)
EOSINOPHIL NFR BLD AUTO: 0.5 % (ref 0.3–6.2)
ERYTHROCYTE [DISTWIDTH] IN BLOOD BY AUTOMATED COUNT: 12.5 % (ref 12.3–15.4)
ETHANOL UR QL: <0.01 %
GFR SERPL CREATININE-BSD FRML MDRD: 123 ML/MIN/1.73
GLOBULIN UR ELPH-MCNC: 3.2 GM/DL
GLUCOSE BLD-MCNC: 99 MG/DL (ref 65–99)
GLUCOSE UR STRIP-MCNC: NEGATIVE MG/DL
HCT VFR BLD AUTO: 43.4 % (ref 37.5–51)
HGB BLD-MCNC: 15.5 G/DL (ref 13–17.7)
HGB UR QL STRIP.AUTO: ABNORMAL
HYALINE CASTS UR QL AUTO: ABNORMAL /LPF
IMM GRANULOCYTES # BLD AUTO: 0.03 10*3/MM3 (ref 0–0.05)
IMM GRANULOCYTES NFR BLD AUTO: 0.3 % (ref 0–0.5)
KETONES UR QL STRIP: ABNORMAL
LEUKOCYTE ESTERASE UR QL STRIP.AUTO: ABNORMAL
LYMPHOCYTES # BLD AUTO: 2.74 10*3/MM3 (ref 0.7–3.1)
LYMPHOCYTES NFR BLD AUTO: 25.8 % (ref 19.6–45.3)
MCH RBC QN AUTO: 28.8 PG (ref 26.6–33)
MCHC RBC AUTO-ENTMCNC: 35.7 G/DL (ref 31.5–35.7)
MCV RBC AUTO: 80.5 FL (ref 79–97)
METHADONE UR QL SCN: NEGATIVE
MONOCYTES # BLD AUTO: 0.66 10*3/MM3 (ref 0.1–0.9)
MONOCYTES NFR BLD AUTO: 6.2 % (ref 5–12)
NEUTROPHILS # BLD AUTO: 7.08 10*3/MM3 (ref 1.7–7)
NEUTROPHILS NFR BLD AUTO: 66.6 % (ref 42.7–76)
NITRITE UR QL STRIP: NEGATIVE
NRBC BLD AUTO-RTO: 0 /100 WBC (ref 0–0.2)
OPIATES UR QL: NEGATIVE
OXYCODONE UR QL SCN: NEGATIVE
PCP UR QL SCN: NEGATIVE
PH UR STRIP.AUTO: 6 [PH] (ref 5–8)
PLATELET # BLD AUTO: 217 10*3/MM3 (ref 140–450)
PMV BLD AUTO: 12.4 FL (ref 6–12)
POTASSIUM BLD-SCNC: 3.9 MMOL/L (ref 3.5–5.2)
PROPOXYPH UR QL: NEGATIVE
PROT SERPL-MCNC: 7.8 G/DL (ref 6–8.5)
PROT UR QL STRIP: NEGATIVE
RBC # BLD AUTO: 5.39 10*6/MM3 (ref 4.14–5.8)
RBC # UR: ABNORMAL /HPF
REF LAB TEST METHOD: ABNORMAL
SALICYLATES SERPL-MCNC: 0.4 MG/DL
SODIUM BLD-SCNC: 140 MMOL/L (ref 136–145)
SP GR UR STRIP: 1.03 (ref 1–1.03)
SQUAMOUS #/AREA URNS HPF: ABNORMAL /HPF
TRICYCLICS UR QL SCN: NEGATIVE
UROBILINOGEN UR QL STRIP: ABNORMAL
WBC NRBC COR # BLD: 10.62 10*3/MM3 (ref 3.4–10.8)
WBC UR QL AUTO: ABNORMAL /HPF

## 2020-03-05 PROCEDURE — 99284 EMERGENCY DEPT VISIT MOD MDM: CPT

## 2020-03-05 PROCEDURE — 80307 DRUG TEST PRSMV CHEM ANLYZR: CPT | Performed by: PHYSICIAN ASSISTANT

## 2020-03-05 PROCEDURE — 80053 COMPREHEN METABOLIC PANEL: CPT | Performed by: PHYSICIAN ASSISTANT

## 2020-03-05 PROCEDURE — 85025 COMPLETE CBC W/AUTO DIFF WBC: CPT | Performed by: PHYSICIAN ASSISTANT

## 2020-03-05 PROCEDURE — 81001 URINALYSIS AUTO W/SCOPE: CPT | Performed by: PHYSICIAN ASSISTANT

## 2020-03-06 ENCOUNTER — HOSPITAL ENCOUNTER (INPATIENT)
Facility: HOSPITAL | Age: 25
LOS: 5 days | Discharge: HOME | End: 2020-03-11
Attending: PSYCHIATRY & NEUROLOGY | Admitting: PSYCHIATRY & NEUROLOGY

## 2020-03-06 PROBLEM — F43.10 POST TRAUMATIC STRESS DISORDER (PTSD): Status: ACTIVE | Noted: 2020-03-06

## 2020-03-06 PROBLEM — R45.851 SUICIDAL IDEATION: Status: ACTIVE | Noted: 2020-03-06

## 2020-03-06 LAB
CHOLEST SERPL-MCNC: 154 MG/DL (ref 0–200)
GLUCOSE P FAST SERPL-MCNC: 87 MG/DL (ref 65–99)
HDLC SERPL-MCNC: 35 MG/DL (ref 40–60)
LDLC SERPL CALC-MCNC: 104 MG/DL (ref 0–100)
LDLC/HDLC SERPL: 2.98 {RATIO}
TRIGL SERPL-MCNC: 73 MG/DL (ref 0–150)
VLDLC SERPL-MCNC: 14.6 MG/DL

## 2020-03-06 PROCEDURE — 82607 VITAMIN B-12: CPT | Performed by: PSYCHIATRY & NEUROLOGY

## 2020-03-06 PROCEDURE — 99223 1ST HOSP IP/OBS HIGH 75: CPT | Performed by: PSYCHIATRY & NEUROLOGY

## 2020-03-06 PROCEDURE — 99232 SBSQ HOSP IP/OBS MODERATE 35: CPT | Performed by: FAMILY MEDICINE

## 2020-03-06 PROCEDURE — 80061 LIPID PANEL: CPT | Performed by: PSYCHIATRY & NEUROLOGY

## 2020-03-06 PROCEDURE — 82947 ASSAY GLUCOSE BLOOD QUANT: CPT | Performed by: PSYCHIATRY & NEUROLOGY

## 2020-03-06 RX ORDER — HYDROXYZINE PAMOATE 50 MG/1
50 CAPSULE ORAL NIGHTLY PRN
Status: DISCONTINUED | OUTPATIENT
Start: 2020-03-06 | End: 2020-03-07

## 2020-03-06 RX ORDER — ALUMINA, MAGNESIA, AND SIMETHICONE 2400; 2400; 240 MG/30ML; MG/30ML; MG/30ML
15 SUSPENSION ORAL EVERY 6 HOURS PRN
Status: DISCONTINUED | OUTPATIENT
Start: 2020-03-06 | End: 2020-03-11 | Stop reason: HOSPADM

## 2020-03-06 RX ORDER — HYDROXYZINE PAMOATE 50 MG/1
50 CAPSULE ORAL EVERY 6 HOURS PRN
Status: DISCONTINUED | OUTPATIENT
Start: 2020-03-06 | End: 2020-03-11 | Stop reason: HOSPADM

## 2020-03-06 RX ORDER — LOPERAMIDE HYDROCHLORIDE 2 MG/1
2 CAPSULE ORAL
Status: DISCONTINUED | OUTPATIENT
Start: 2020-03-06 | End: 2020-03-11 | Stop reason: HOSPADM

## 2020-03-06 RX ORDER — TRAZODONE HYDROCHLORIDE 50 MG/1
50 TABLET ORAL NIGHTLY PRN
Status: DISCONTINUED | OUTPATIENT
Start: 2020-03-06 | End: 2020-03-06

## 2020-03-06 RX ORDER — SERTRALINE HYDROCHLORIDE 25 MG/1
25 TABLET, FILM COATED ORAL DAILY
Status: COMPLETED | OUTPATIENT
Start: 2020-03-06 | End: 2020-03-06

## 2020-03-06 RX ORDER — CLONIDINE HYDROCHLORIDE 0.1 MG/1
0.1 TABLET ORAL EVERY 4 HOURS PRN
Status: DISCONTINUED | OUTPATIENT
Start: 2020-03-06 | End: 2020-03-11 | Stop reason: HOSPADM

## 2020-03-06 RX ORDER — ACETAMINOPHEN 325 MG/1
650 TABLET ORAL EVERY 4 HOURS PRN
Status: DISCONTINUED | OUTPATIENT
Start: 2020-03-06 | End: 2020-03-11 | Stop reason: HOSPADM

## 2020-03-06 RX ADMIN — SERTRALINE HYDROCHLORIDE 25 MG: 25 TABLET ORAL at 16:55
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
## 2020-03-06 NOTE — ED NOTES
[facility] STAFF AT BEDSIDE.      Matt Whitt, RN  03/05/20 2001
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
## 2020-03-06 NOTE — ED NOTES
[facility] NOTIFIED FOR TRANSPORT TO [facility]      Matt Whitt, RN  03/05/20 7822
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
## 2020-03-06 NOTE — ED NOTES
[facility] NOTIFIED AND PLACED ON STANDYBY FOR THE TRANSPORT UNTIL [facility] RETURNS THEIR PHONE CALL.      Matt Whitt RN  03/05/20 [id number]
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
## 2020-03-06 NOTE — PLAN OF CARE
Problem: Patient Care Overview  Goal: Plan of Care Review  Outcome: Ongoing (interventions implemented as appropriate)  Flowsheets  Taken 3/6/2020 0335 by Gina Santizo, RN  Progress: no change  Plan of Care Reviewed With: patient  Patient Agreement with Plan of Care: agrees  Taken 3/6/2020 1645 by Valdemar Arauz, RN  Outcome Summary: Tyrel has been cooperative today and actively participated in groups.

## 2020-03-06 NOTE — PLAN OF CARE
Problem: Overarching Goals (Adult)  Goal: Optimized Coping Skills in Response to Life Stressors  Outcome: Ongoing (interventions implemented as appropriate)  Note:   Met with patient and completed Recreation Therapy Assessment.  Patient reports that he continues to feel suicidal and has felt depressed and anxious for awhile.  He states that he has had no motivation to do anything but watch TV.  In the past, patient states that he has enjoyed fishing.  Patient states that he plays on his phone to cope with stress.  Patient states that he was living with his cousin.  However, after his recent incarceration, patient does not know if the cousin will let him return.  Patient states that he does not want to feel suicidal and depressed anymore.  Will continue to encourage group participation and identification of healthy outlets.
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
## 2020-03-06 NOTE — NURSING NOTE
Review of Current Symptoms--only current symptoms        · General   NONE    · Eyes    None     · ENT/Mouth    None    · Cardio    None    · Resp    None    · GI     None    ·     None    · MS    Joint Pain    · Skin/Hair/Nails    None    · Neuro    None

## 2020-03-06 NOTE — PLAN OF CARE
Problem: Patient Care Overview  Goal: Plan of Care Review  Outcome: Ongoing (interventions implemented as appropriate)  Flowsheets (Taken 3/6/2020 5634)  Progress: no change  Plan of Care Reviewed With: patient  Patient Agreement with Plan of Care: agrees  Outcome Summary: New admission

## 2020-03-06 NOTE — ED NOTES
Janette from [facility] returned call stating pt was accepted and would be going to 660. No further Pt report would not be necessary     Skylar Gerard RN  03/05/20 4404
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
## 2020-03-06 NOTE — ED NOTES
CHERELLE, [facility] RETURNED A CALL STATING THAT THE PATIENT COULD NOT LEAVE Psychiatric UNTIL THEIR PSYCHIATRIST REVIEWS THE DOCUMENTATION.  SHE STATES THAT UPON AT TIME THEY WILL RETURN A CALL TO STATE WHETHER THE PATIENT COULD LEAVE THIS FACILITY.           Matt Whitt, RN  03/05/20 5743

## 2020-03-07 LAB — VIT B12 BLD-MCNC: 295 PG/ML (ref 211–946)

## 2020-03-07 PROCEDURE — 93010 ELECTROCARDIOGRAM REPORT: CPT | Performed by: INTERNAL MEDICINE

## 2020-03-07 PROCEDURE — 99232 SBSQ HOSP IP/OBS MODERATE 35: CPT | Performed by: PSYCHIATRY & NEUROLOGY

## 2020-03-07 PROCEDURE — 93005 ELECTROCARDIOGRAM TRACING: CPT | Performed by: PSYCHIATRY & NEUROLOGY

## 2020-03-07 RX ORDER — HYDROXYZINE PAMOATE 50 MG/1
50 CAPSULE ORAL NIGHTLY
Status: DISCONTINUED | OUTPATIENT
Start: 2020-03-07 | End: 2020-03-08

## 2020-03-07 RX ORDER — ARIPIPRAZOLE 2 MG/1
2 TABLET ORAL DAILY
Status: COMPLETED | OUTPATIENT
Start: 2020-03-07 | End: 2020-03-07

## 2020-03-07 RX ORDER — LANOLIN ALCOHOL/MO/W.PET/CERES
1000 CREAM (GRAM) TOPICAL DAILY
Status: DISCONTINUED | OUTPATIENT
Start: 2020-03-07 | End: 2020-03-11 | Stop reason: HOSPADM

## 2020-03-07 RX ORDER — ARIPIPRAZOLE 5 MG/1
5 TABLET ORAL DAILY
Status: DISCONTINUED | OUTPATIENT
Start: 2020-03-08 | End: 2020-03-08

## 2020-03-07 RX ADMIN — CYANOCOBALAMIN TAB 1000 MCG 1000 MCG: 1000 TAB at 18:17

## 2020-03-07 RX ADMIN — HYDROXYZINE PAMOATE 50 MG: 50 CAPSULE ORAL at 20:48

## 2020-03-07 RX ADMIN — ARIPIPRAZOLE 2 MG: 2 TABLET ORAL at 18:18

## 2020-03-07 RX ADMIN — SERTRALINE HYDROCHLORIDE 50 MG: 50 TABLET ORAL at 10:39

## 2020-03-07 NOTE — PROGRESS NOTES
3/7/2020    Chief Complaint: suicidal ideation and self injurious behavior    Subjective:  Patient is a 24 y.o. male who was hospitalized for suicidal ideation and self injurious behavior.     Patient was seen in his room.  He was less withdrawn and reported feeling better.  During his previous admissions he was animated, interactive, intrusive and impulsive.  He today continues to be withdrawn and stays in his room.  He notes awakening around 1am last night.  He did not receive the PRN vistaril for sleep last night.  He did not know to ask for it.    Objective     Vital Signs    Temp:  [97.5 °F (36.4 °C)-98.1 °F (36.7 °C)] 98.1 °F (36.7 °C)  Heart Rate:  [66-78] 66  Resp:  [18] 18  BP: (107-116)/(56-59) 116/56    Physical Exam:   General Appearance: alert, appears stated age and cooperative,  Hygiene:   fair  Gait & Station: Normal  Musculoskeletal: No tremors or abnormal involuntary movements    Mental Status Exam:   Cooperation:  Cooperative  Eye Contact:  Good  Psychomotor Behavior:  Appropriate  Mood: Sad/Depressed  Affect:  mood-congruent  Speech:  Normal  Thought Process:  Coherent and [place]  Associations: Goal Directed  Thought Content:     Mood congruent   Suicidal:  Denies   Homicidal:  None   Hallucinations:  None   Delusion:  None  Cognitive Functioning:   Consciousness: awake, alert and oriented  Reliability:  adequate  Insight:  limited  Judgement:  Impaired  Impulse Control:  Impaired    Lab Results:  Lab Results (last 24 hours)     Procedure Component Value Units Date/Time    Vitamin B12 [[id number]]  (Normal) Collected:  03/06/20 1730    Specimen:  Blood Updated:  03/07/20 0108     Vitamin B-12 295 pg/mL     Narrative:       Results may be falsely increased if patient taking Biotin.            Radiology Results:  Imaging Results (Last 24 Hours)     ** No results found for the last 24 hours. **          Medicine:   Current Facility-Administered Medications:   •  acetaminophen (TYLENOL) tablet 650  mg, 650 mg, Oral, Q4H PRN, Kaiser Ovalle MD  •  aluminum-magnesium hydroxide-simethicone (MAALOX MAX) 400-400-40 MG/5ML suspension 15 mL, 15 mL, Oral, Q6H PRN, Kaiser Ovalel MD  •  cloNIDine (CATAPRES) tablet 0.1 mg, 0.1 mg, Oral, Q4H PRN, Kaiser Ovalle MD  •  hydrOXYzine pamoate (VISTARIL) capsule 50 mg, 50 mg, Oral, Q6H PRN, Kaiser Ovalle MD  •  hydrOXYzine pamoate (VISTARIL) capsule 50 mg, 50 mg, Oral, Nightly PRN, Kaiser Ovalle MD  •  loperamide (IMODIUM) capsule 2 mg, 2 mg, Oral, Q2H PRN, Kaiser Ovalle MD  •  magnesium hydroxide (MILK OF MAGNESIA) suspension 2400 mg/10mL 10 mL, 10 mL, Oral, Daily PRN, Kaiser Ovalle MD  •  nicotine polacrilex (NICORETTE) gum 2 mg, 2 mg, Mouth/Throat, Q2H PRN, Kaiser Ovalle MD  •  [COMPLETED] sertraline (ZOLOFT) tablet 25 mg, 25 mg, Oral, Daily, 25 mg at 03/06/20 1655 **FOLLOWED BY** sertraline (ZOLOFT) tablet 50 mg, 50 mg, Oral, Daily, Kaiser Ovalle MD, 50 mg at 03/07/20 1039    Diagnoses/Assessment:     Severe episode of recurrent major depressive disorder, without psychotic features (CMS/HCC)    Impulse control disorder    Borderline intellectual functioning    Suicidal ideation    Post traumatic stress disorder (PTSD)      Treatment Plan:    1) Will continue care for the patient on the behavioral health unit at [facility] to ensure patient safety.  2) Will continue to provide treatment with the unit milieu, activities, therapies and psychopharmacological management.  3) Patient to be placed on or continued on  Q15 minute checks  and Suicide precautions.  4) Pertinent medical issues: low vit b-12: supplemented  5) Will order following labs: reviewed  6) Will make the following medication changes:   --Continue zoloft 50mg daily  --Start abilify 2mg daily  --Change vistaril to 50mg qhs  7) Will continue discharge planning as appropriate for patient.  8) Psychotherapy provided for less than 16  minutes.    Treatment plan and medication risks and benefits discussed with: Patient    Kaiser Ovalle MD  03/07/20 at 5:38 PM
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
## 2020-03-08 PROCEDURE — 99232 SBSQ HOSP IP/OBS MODERATE 35: CPT | Performed by: PSYCHIATRY & NEUROLOGY

## 2020-03-08 RX ORDER — QUETIAPINE FUMARATE 100 MG/1
100 TABLET, FILM COATED ORAL NIGHTLY
Status: DISCONTINUED | OUTPATIENT
Start: 2020-03-08 | End: 2020-03-09

## 2020-03-08 RX ADMIN — CYANOCOBALAMIN TAB 1000 MCG 1000 MCG: 1000 TAB at 08:58

## 2020-03-08 RX ADMIN — QUETIAPINE 100 MG: 100 TABLET ORAL at 20:10

## 2020-03-08 RX ADMIN — SERTRALINE HYDROCHLORIDE 50 MG: 50 TABLET ORAL at 08:58

## 2020-03-08 RX ADMIN — ARIPIPRAZOLE 5 MG: 5 TABLET ORAL at 08:58

## 2020-03-08 NOTE — PLAN OF CARE
"  Problem: Patient Care Overview  Goal: Plan of Care Review  Outcome: Ongoing (interventions implemented as appropriate)    Patient resting at this time. He is pleasant and cooperative with medication administration and assessment. Denies SI/HI, states \"Im ok. Just a little tired today\".  "

## 2020-03-08 NOTE — NURSING NOTE
"Behavior   Note any precipitants to event or behavior   Describe level and action of any aggressive behavior or speech and associated interventions.     Anxiety: Patient denies at this time  Depression: Patient denies at this time  Pain  0  AVH   no  S/I   no  Plan  no  H/I   no  Plan  no    Affect   labile      Note: Patient resting at this time. He is pleasant and cooperative with medication administration and assessment. Denies SI/HI, states \"Im ok. Just a little tired today\".      Intervention    PRN medication utilized:  no    Instructed in medication usage and effects  Medications administered as ordered  Encouraged to verbalize needs      Response    Verbalized understanding   Did patient take medications as ordered yes   Did patient interact with assessment?  yes     Plan    Will monitor for safety  Will monitor every 15 minutes as ordered  Will evaluate and promote the plan of care    Last BM:  March 7, 2020  (Please chart in I/O as well)    "

## 2020-03-08 NOTE — PROGRESS NOTES
3/8/2020    Chief Complaint: suicidal ideation and self injurious behavior    Subjective:  Patient is a 24 y.o. male who was hospitalized for suicidal ideation and self injurious behavior.     Patient was seen in his room.  He continues to be isolating in his room unlike previous admission.  Patient continues to be depressed and apathetic.  He notes poor sleep at night.  He notes no issues tolerating medications.    Objective     Vital Signs    Temp:  [98.4 °F (36.9 °C)] 98.4 °F (36.9 °C)  Heart Rate:  [89] 89  Resp:  [18] 18  BP: (105)/(53) 105/53    Physical Exam:   General Appearance: alert, appears stated age and cooperative,  Hygiene:   fair  Gait & Station: Normal  Musculoskeletal: No tremors or abnormal involuntary movements    Mental Status Exam:   Cooperation:  Cooperative  Eye Contact:  Good  Psychomotor Behavior:  Appropriate  Mood: Sad/Depressed  Affect:  mood-congruent  Speech:  Normal  Thought Process:  Coherent and [place]  Associations: Goal Directed  Thought Content:     Mood congruent   Suicidal:  Denies   Homicidal:  None   Hallucinations:  None   Delusion:  None  Cognitive Functioning:   Consciousness: awake, alert and oriented  Reliability:  adequate  Insight:  limited  Judgement:  Impaired  Impulse Control:  Impaired    Lab Results:  Lab Results (last 24 hours)     ** No results found for the last 24 hours. **          Radiology Results:  Imaging Results (Last 24 Hours)     ** No results found for the last 24 hours. **          Medicine:   Current Facility-Administered Medications:   •  acetaminophen (TYLENOL) tablet 650 mg, 650 mg, Oral, Q4H PRN, Kaiser Ovalle MD  •  aluminum-magnesium hydroxide-simethicone (MAALOX MAX) 400-400-40 MG/5ML suspension 15 mL, 15 mL, Oral, Q6H PRN, Kaiser Ovalle MD  •  [COMPLETED] ARIPiprazole (ABILIFY) tablet 2 mg, 2 mg, Oral, Daily, 2 mg at 03/07/20 1818 **FOLLOWED BY** ARIPiprazole (ABILIFY) tablet 5 mg, 5 mg, Oral, Daily, Kaiser Ovalle MD, 5  mg at 03/08/20 0858  •  cloNIDine (CATAPRES) tablet 0.1 mg, 0.1 mg, Oral, Q4H PRN, Kaiser Ovalle MD  •  hydrOXYzine pamoate (VISTARIL) capsule 50 mg, 50 mg, Oral, Q6H PRN, Kaiser Ovalle MD  •  hydrOXYzine pamoate (VISTARIL) capsule 50 mg, 50 mg, Oral, Nightly, Kaiser Ovalle MD, 50 mg at 03/07/20 2048  •  loperamide (IMODIUM) capsule 2 mg, 2 mg, Oral, Q2H PRN, Kaiser Ovalle MD  •  magnesium hydroxide (MILK OF MAGNESIA) suspension 2400 mg/10mL 10 mL, 10 mL, Oral, Daily PRN, Kaiser Ovalle MD  •  nicotine polacrilex (NICORETTE) gum 2 mg, 2 mg, Mouth/Throat, Q2H PRN, Kaiser Ovalle MD  •  [COMPLETED] sertraline (ZOLOFT) tablet 25 mg, 25 mg, Oral, Daily, 25 mg at 03/06/20 1655 **FOLLOWED BY** sertraline (ZOLOFT) tablet 50 mg, 50 mg, Oral, Daily, Kaiser Ovalle MD, 50 mg at 03/08/20 0858  •  vitamin B-12 (CYANOCOBALAMIN) tablet 1,000 mcg, 1,000 mcg, Oral, Daily, Kaiser Ovalle MD, 1,000 mcg at 03/08/20 0858    Diagnoses/Assessment:     Severe episode of recurrent major depressive disorder, without psychotic features (CMS/HCC)    Impulse control disorder    Borderline intellectual functioning    Suicidal ideation    Post traumatic stress disorder (PTSD)      Treatment Plan:    1) Will continue care for the patient on the behavioral health unit at [facility] to ensure patient safety.  2) Will continue to provide treatment with the unit milieu, activities, therapies and psychopharmacological management.  3) Patient to be placed on or continued on  Q15 minute checks  and Suicide precautions.  4) Pertinent medical issues: low vit b-12: supplemented  5) Will order following labs: reviewed  6) Will make the following medication changes:   --Increase zoloft to 100mg daily  --Stop abilify and start seroquel 100mg qhs.  --Stop vistaril 50mg qhs  7) Will continue discharge planning as appropriate for patient.  8) Psychotherapy provided for less than 16  minutes.    Treatment plan and medication risks and benefits discussed with: Patient    Kaiser Ovalle MD  03/08/20 at 2:04 PM

## 2020-03-08 NOTE — NURSING NOTE
Behavior   Note any precipitants to event or behavior   Describe level and action of any aggressive behavior or speech and associated interventions.     Anxiety: Excess Worry  Depression: depressed mood  Pain  0  AVH   no  S/I   no  Plan  no  H/I   no  Plan  no    Affect   euthymic/normal      Note: Tyrel continues to state improvement with depression and intermittent anxiety that is not always present. His sleeping patterns have been disrupted but he states improvement with sleep as well.      Intervention    PRN medication utilized:  no    Instructed in medication usage and effects  Medications administered as ordered  Encouraged to verbalize needs      Response    Verbalized understanding   Did patient take medications as ordered yes   Did patient interact with assessment?  yes     Plan    Will monitor for safety  Will monitor every 15 minutes as ordered  Will evaluate and promote the plan of care    Last BM:  March 7, 2020  (Please chart in I/O as well)

## 2020-03-08 NOTE — PLAN OF CARE
Problem: Patient Care Overview  Goal: Plan of Care Review  Outcome: Ongoing (interventions implemented as appropriate)  Flowsheets  Taken 3/6/2020 0335 by Gina Santizo, RN  Progress: no change  Taken 3/6/2020 2010 by Mague Brody, RN  Plan of Care Reviewed With: patient  Patient Agreement with Plan of Care: agrees  Taken 3/8/2020 1747 by Valdemar Arauz, RN  Outcome Summary: Tyrel states that he feels he has improved since admission and less depression

## 2020-03-08 NOTE — NURSING NOTE
Behavior   Note any precipitants to event or behavior   Describe level and action of any aggressive behavior or speech and associated interventions.     Anxiety: Restless/Edgy  Depression: depressed mood  Pain  0  AVH   no  S/I   no  Plan  no  H/I   no  Plan  no    Affect   euthymic/normal      Note:  Tyrel noted some depression and anxiety in early am but remained in bed for most of the day. He was also observed staying awake late last night.     Intervention    PRN medication utilized:  no    Instructed in medication usage and effects  Medications administered as ordered  Encouraged to verbalize needs      Response    Verbalized understanding   Did patient take medications as ordered yes   Did patient interact with assessment?  yes     Plan    Will monitor for safety  Will monitor every 15 minutes as ordered  Will evaluate and promote the plan of care    Last BM:  unknown date  (Please chart in I/O as well)

## 2020-03-08 NOTE — PLAN OF CARE
Problem: Suicidal Behavior (Adult)  Goal: Suicidal Behavior is Absent/Minimized/Managed  Outcome: Ongoing (interventions implemented as appropriate)  Flowsheets (Taken 3/7/2020 0430 by Brittany Alfaro RN)  Suicidal Behavior Managed/Minimized Action Step (STG) Outcome: making progress toward outcome

## 2020-03-09 PROCEDURE — 99232 SBSQ HOSP IP/OBS MODERATE 35: CPT | Performed by: NURSE PRACTITIONER

## 2020-03-09 RX ORDER — QUETIAPINE FUMARATE 25 MG/1
75 TABLET, FILM COATED ORAL NIGHTLY
Status: DISCONTINUED | OUTPATIENT
Start: 2020-03-09 | End: 2020-03-10

## 2020-03-09 RX ADMIN — QUETIAPINE FUMARATE 75 MG: 25 TABLET ORAL at 20:24

## 2020-03-09 RX ADMIN — CYANOCOBALAMIN TAB 1000 MCG 1000 MCG: 1000 TAB at 08:26

## 2020-03-09 RX ADMIN — SERTRALINE HYDROCHLORIDE 100 MG: 50 TABLET ORAL at 08:26

## 2020-03-09 NOTE — PLAN OF CARE
Tyrel has remained free from falls or injury thus far this shift. He reports that he is feeling better. He states he is not as tired as he was during the weekend. He does not feel as stressed or anxious as when he was admitted. He denies any thoughts of SI. He does continue to sometimes feel hopeless and depressed.

## 2020-03-09 NOTE — NURSING NOTE
"Behavior   Note any precipitants to event or behavior   Describe level and action of any aggressive behavior or speech and associated interventions.     Anxiety: Easily fatigued and Decreased concentration  Depression: depressed mood and hypersomnia  Pain  0  AVH   no  S/I   no  Plan  no  H/I   no  Plan  no    Affect   euthymic/normal      Note: Pt observed in room sleeping. Easily awakened and compliant with medication administration and assessment. Patient reports that he has slept most of the day and, although he has tried to stay awake, is easily fatigued. States \"I think this new medicine might be making me too sleepy. I feel better but I think Im sleeping way too much.\"  Encouraged patient to discuss concerns with MD tomorrow during 1:1 session. Pt in agreement and states \"Yea, he might need to change my meds a little.\" Pleasant and cooperative with care.       Intervention    PRN medication utilized:  no    Instructed in medication usage and effects  Medications administered as ordered  Encouraged to verbalize needs      Response    Verbalized understanding   Did patient take medications as ordered yes   Did patient interact with assessment?  yes     Plan    Will monitor for safety  Will monitor every 15 minutes as ordered  Will evaluate and promote the plan of care    Last BM:  March 8, 2020  (Please chart in I/O as well)    "

## 2020-03-09 NOTE — PROGRESS NOTES
"3/9/2020    Chief Complaint: suicidal ideation and depression    Subjective:  Patient is a 24 y.o. male that is currently inpatient on the adult U  Today he is seen individually and in treatment team. Patient reports that he continues to feel depressed and feeling helpless and hopeless.  Patient reports that he wants to be successful, he wants to get a job and a steady income. He reports that it makes him feel good to be at a job.   Patient states that he has goals to go to school to be a .    Patient reports that he is very tired and drowsy on medications, discussed lowering the seroquel at night to see if it improves drowsiness.   Patient does not report any GI distress or headache.    Objective     Vital Signs    Temp:  [96.8 °F (36 °C)-99 °F (37.2 °C)] 96.8 °F (36 °C)  Heart Rate:  [81-85] 85  Resp:  [16-18] 16  BP: (120-121)/(55-58) 121/55    Physical Exam:   General Appearance: alert, appears stated age and cooperative,  Hygiene:   fair  Gait & Station: Normal  Musculoskeletal: No tremors or abnormal involuntary movements    Mental Status Exam:   Cooperation:  Cooperative  Eye Contact:  Fair  Psychomotor Behavior:  Appropriate  Mood: \"Fine\" and Improving  Affect:  mood-congruent  Speech:  Normal  Thought Process:  Coherent  Associations: Goal Directed  Thought Content:     Mood congruent   Suicidal:  None   Homicidal:  None   Hallucinations:  Not demonstrated today   Delusion:  Unable to demonstrate  Cognitive Functioning:   Consciousness: awake, alert and oriented  Reliability:  fair  Insight:  Fair  Judgement:  Fair  Impulse Control:  Fair    Lab Results (last 24 hours)     ** No results found for the last 24 hours. **        Imaging Results (Last 24 Hours)     ** No results found for the last 24 hours. **          Medicine:   Current Facility-Administered Medications:   •  acetaminophen (TYLENOL) tablet 650 mg, 650 mg, Oral, Q4H PRN, Kaiser Ovalle MD  •  aluminum-magnesium " hydroxide-simethicone (MAALOX MAX) 400-400-40 MG/5ML suspension 15 mL, 15 mL, Oral, Q6H PRN, Kaiser Ovalle MD  •  cloNIDine (CATAPRES) tablet 0.1 mg, 0.1 mg, Oral, Q4H PRN, Kaiser Ovalle MD  •  hydrOXYzine pamoate (VISTARIL) capsule 50 mg, 50 mg, Oral, Q6H PRN, Kaiser Ovalle MD  •  loperamide (IMODIUM) capsule 2 mg, 2 mg, Oral, Q2H PRN, Kaiser Ovalle MD  •  magnesium hydroxide (MILK OF MAGNESIA) suspension 2400 mg/10mL 10 mL, 10 mL, Oral, Daily PRN, Kaiser Ovalle MD  •  nicotine polacrilex (NICORETTE) gum 2 mg, 2 mg, Mouth/Throat, Q2H PRN, Kaiser Ovalle MD  •  QUEtiapine (SEROquel) tablet 75 mg, 75 mg, Oral, Nightly, Mirta Roman, KE  •  [COMPLETED] sertraline (ZOLOFT) tablet 25 mg, 25 mg, Oral, Daily, 25 mg at 03/06/20 1655 **FOLLOWED BY** sertraline (ZOLOFT) tablet 100 mg, 100 mg, Oral, Daily, Kaiser Ovalle MD, 100 mg at 03/09/20 0826  •  vitamin B-12 (CYANOCOBALAMIN) tablet 1,000 mcg, 1,000 mcg, Oral, Daily, Kaiser Ovalle MD, 1,000 mcg at 03/09/20 0826    Diagnoses/Assessment:     Severe episode of recurrent major depressive disorder, without psychotic features (CMS/HCC)    Impulse control disorder    Borderline intellectual functioning    Suicidal ideation    Post traumatic stress disorder (PTSD)      Treatment Plan:    1) Will continue care for the patient on the behavioral health unit at [facility] to ensure patient safety.  2) Will continue to provide treatment with the unit milieu, activities, therapies and psychopharmacological management.  3) Patient to be placed on or continued on  Q15 minute checks  and Suicide precautions.  4) Pertinent medical issues: none  5) Will order following labs: none  6) Will make the following medication changes:   --Cont Zoloft 100 mg daily  --Decrease Seroquel to 75 mg qHS  7) Will continue discharge planning as appropriate for patient.  8) Psychotherapy provided for less than 16 minutes.    Treatment  plan and medication risks and benefits discussed with: Patient    KE Hinton  03/09/20  2:42 PM

## 2020-03-09 NOTE — PLAN OF CARE
"  Problem: Patient Care Overview  Goal: Plan of Care Review  Outcome: Ongoing (interventions implemented as appropriate)   Pt observed in room sleeping. Easily awakened and compliant with medication administration and assessment. Patient reports that he has slept most of the day and, although he has tried to stay awake, is easily fatigued. States \"I think this new medicine might be making me too sleepy. I feel better but I think Im sleeping way too much.\"  Encouraged patient to discuss concerns with MD tomorrow during 1:1 session. Pt in agreement and states \"Yea, he might need to change my meds a little.\" Pleasant and cooperative with care.   "

## 2020-03-09 NOTE — NURSING NOTE
"Behavior   Note any precipitants to event or behavior   Describe level and action of any aggressive behavior or speech and associated interventions.     Anxiety: Decreased concentration  Depression: depressed mood  Pain  0  AVH   no  S/I   no  Plan  no  H/I   no  Plan  no    Affect   euthymic/normal      Note: Tyrel is at the desk talking with staff. He is more alert this am. He says when he gets really stressed he sleeps like that (referring to sleeping almost all weekend). He says he feels much better. He states that are people out there he would \"like to get a hold of but I don't want to go to [facility] like my uncle\". He has decreased concentration and depressed mood. His affect is normal. No abnormal movements or gestures.        Intervention    PRN medication utilized:  no    Instructed in medication usage and effects  Medications administered as ordered  Encouraged to verbalize needs      Response    Verbalized understanding   Did patient take medications as ordered yes   Did patient interact with assessment?  yes     Plan    Will monitor for safety  Will monitor every 15 minutes as ordered  Will evaluate and promote the plan of care    Last BM:  unknown date  (Please chart in I/O as well)    "

## 2020-03-10 PROCEDURE — 90833 PSYTX W PT W E/M 30 MIN: CPT | Performed by: NURSE PRACTITIONER

## 2020-03-10 PROCEDURE — 99232 SBSQ HOSP IP/OBS MODERATE 35: CPT | Performed by: NURSE PRACTITIONER

## 2020-03-10 RX ORDER — QUETIAPINE FUMARATE 25 MG/1
50 TABLET, FILM COATED ORAL NIGHTLY
Status: DISCONTINUED | OUTPATIENT
Start: 2020-03-10 | End: 2020-03-11 | Stop reason: HOSPADM

## 2020-03-10 RX ORDER — GUANFACINE 1 MG/1
1 TABLET ORAL 2 TIMES DAILY
Status: DISCONTINUED | OUTPATIENT
Start: 2020-03-10 | End: 2020-03-11 | Stop reason: HOSPADM

## 2020-03-10 RX ADMIN — NICOTINE POLACRILEX 2 MG: 2 GUM, CHEWING BUCCAL at 18:28

## 2020-03-10 RX ADMIN — QUETIAPINE FUMARATE 50 MG: 25 TABLET ORAL at 20:17

## 2020-03-10 RX ADMIN — CYANOCOBALAMIN TAB 1000 MCG 1000 MCG: 1000 TAB at 08:15

## 2020-03-10 RX ADMIN — SERTRALINE HYDROCHLORIDE 100 MG: 50 TABLET ORAL at 08:15

## 2020-03-10 RX ADMIN — NICOTINE POLACRILEX 2 MG: 2 GUM, CHEWING BUCCAL at 16:34

## 2020-03-10 RX ADMIN — GUANFACINE 1 MG: 1 TABLET ORAL at 20:17

## 2020-03-10 NOTE — NURSING NOTE
Behavior   Note any precipitants to event or behavior   Describe level and action of any aggressive behavior or speech and associated interventions.     Anxiety: Patient denies at this time  Depression: Patient denies at this time  Pain  0  AVH   no  S/I   no  Plan  no  H/I   no  Plan  no    Affect   euthymic/normal      Note: Patient in common area on assessment. He is alert and oriented. His affect is normal, he makes appropriate eye contact and interacts with peers and staff appropriately. He denies above behaviors, is cooperative and takes all medications as ordered. Encouraged open communication with staff. Patient agreeable to make needs known. No acute S/S of distress.       Intervention    PRN medication utilized:  no    Instructed in medication usage and effects  Medications administered as ordered  Encouraged to verbalize needs      Response    Verbalized understanding   Did patient take medications as ordered yes   Did patient interact with assessment?  yes     Plan    Will monitor for safety  Will monitor every 15 minutes as ordered  Will evaluate and promote the plan of care    Last BM:  unknown date  (Please chart in I/O as well)

## 2020-03-10 NOTE — PLAN OF CARE
Problem: Patient Care Overview  Goal: Plan of Care Review  Outcome: Ongoing (interventions implemented as appropriate)  Flowsheets (Taken 3/10/2020 0407)  Progress: no change  Plan of Care Reviewed With: patient  Patient Agreement with Plan of Care: agrees  Outcome Summary: Patient is cooperative and compliant with medications. Patient denies S/I, H/I and AVH at this time. Patient still isolating self in his room. Patient appears to have slept well.

## 2020-03-10 NOTE — NURSING NOTE
Behavior   Note any precipitants to event or behavior   Describe level and action of any aggressive behavior or speech and associated interventions.     Anxiety: Decreased concentration  Depression: depressed mood and difficulty concentrating  Pain  0  AVH   no  S/I   no  Plan  no  H/I   no  Plan  no    Affect   euthymic/normal and blunted      Note: Patient sleeping when signee entered room. Patient is cooperative and compliant with medications. Patient denies S/I, H/I and AVH at this time. Patient still isolating self in his room.      Intervention    PRN medication utilized:  no    Instructed in medication usage and effects  Medications administered as ordered  Encouraged to verbalize needs      Response    Verbalized understanding   Did patient take medications as ordered no  Did patient interact with assessment?  no    Plan    Will monitor for safety  Will monitor every 15 minutes as ordered  Will evaluate and promote the plan of care    Last BM:    (Please chart in I/O as well)

## 2020-03-10 NOTE — PLAN OF CARE
Problem: Patient Care Overview  Goal: Plan of Care Review  Outcome: Ongoing (interventions implemented as appropriate)  Flowsheets (Taken 3/10/2020 1603)  Progress: improving  Plan of Care Reviewed With: patient  Patient Agreement with Plan of Care: agrees  Outcome Summary: Patient is calm, cooperative and compliant with medications. He denies anxiety, depression, SI, HI and AVH.

## 2020-03-10 NOTE — PROGRESS NOTES
"3/10/2020    Chief Complaint: suicidal ideation and depression    Subjective:  Patient is a 24 y.o. male that is currently inpatient on the adult [facility]  Today Pt reports that he is feeling better, he is asking about discharge and intends on going to the college and then to get a job when discharged.  Patient's has had a complete turnaround since yesterday where he was reporting ongoing depression and wanting to sleep most of the day.    Patient has a history of impulsive and erratic behavior that tends to begin with such grandiosity that he will leave and get a job making a large salary.    Patient is agreeable to staying and starting on Quanfacine for less impulsive thoughts.    Patient reports sleeping well.  No SI/HI/AVH No GI distress related to medications.   Objective     Vital Signs    Temp:  [97.3 °F (36.3 °C)-97.7 °F (36.5 °C)] 97.3 °F (36.3 °C)  Heart Rate:  [] 100  Resp:  [18] 18  BP: (109-113)/(62-66) 113/66    Physical Exam:   General Appearance: alert, appears stated age and cooperative,  Hygiene:   fair  Gait & Station: Normal  Musculoskeletal: No tremors or abnormal involuntary movements    Mental Status Exam:   Cooperation:  Cooperative  Eye Contact:  Good  Psychomotor Behavior:  Hyperactive  Mood: \"Fine\" and Improving  Affect:  increased in intensity and mood-congruent  Speech:  Rapid  Thought Process:  Coherent  Associations: Goal Directed  Thought Content:     Mood congruent   Suicidal:  None   Homicidal:  None   Hallucinations:  None   Delusion:  None  Cognitive Functioning:   Consciousness: awake and alert  Reliability:  fair  Insight:  Fair  Judgement:  Poor  Impulse Control:  Poor    Lab Results (last 24 hours)     ** No results found for the last 24 hours. **        Imaging Results (Last 24 Hours)     ** No results found for the last 24 hours. **          Medicine:   Current Facility-Administered Medications:   •  acetaminophen (TYLENOL) tablet 650 mg, 650 mg, Oral, Q4H PRN, Vasquez, " MD Kaiser  •  aluminum-magnesium hydroxide-simethicone (MAALOX MAX) 400-400-40 MG/5ML suspension 15 mL, 15 mL, Oral, Q6H PRN, Kaiser Ovalle MD  •  cloNIDine (CATAPRES) tablet 0.1 mg, 0.1 mg, Oral, Q4H PRN, Kaiser Ovalle MD  •  hydrOXYzine pamoate (VISTARIL) capsule 50 mg, 50 mg, Oral, Q6H PRN, Kaiser Ovalle MD  •  loperamide (IMODIUM) capsule 2 mg, 2 mg, Oral, Q2H PRN, Kaiser Ovalle MD  •  magnesium hydroxide (MILK OF MAGNESIA) suspension 2400 mg/10mL 10 mL, 10 mL, Oral, Daily PRN, Kaiser Ovalle MD  •  nicotine polacrilex (NICORETTE) gum 2 mg, 2 mg, Mouth/Throat, Q2H PRN, Kaiser Ovalle MD  •  QUEtiapine (SEROquel) tablet 75 mg, 75 mg, Oral, Nightly, Mirta Roman, APRN, 75 mg at 03/09/20 2024  •  [COMPLETED] sertraline (ZOLOFT) tablet 25 mg, 25 mg, Oral, Daily, 25 mg at 03/06/20 1655 **FOLLOWED BY** sertraline (ZOLOFT) tablet 100 mg, 100 mg, Oral, Daily, Kaiser Ovalle MD, 100 mg at 03/10/20 0815  •  vitamin B-12 (CYANOCOBALAMIN) tablet 1,000 mcg, 1,000 mcg, Oral, Daily, Kaiser Ovalle MD, 1,000 mcg at 03/10/20 0815    Diagnoses/Assessment:     Severe episode of recurrent major depressive disorder, without psychotic features (CMS/HCC)    Impulse control disorder    Borderline intellectual functioning    Suicidal ideation    Post traumatic stress disorder (PTSD)      Treatment Plan:    1) Will continue care for the patient on the behavioral health unit at [facility] to ensure patient safety.  2) Will continue to provide treatment with the unit milieu, activities, therapies and psychopharmacological management.  3) Patient to be placed on or continued on  Q15 minute checks  and Suicide precautions.  4) Pertinent medical issues: none  5) Will order following labs: none  6) Will make the following medication changes:   --Decrease Seroquel 50 mg at night  --Cont Zoloft 100 mg daily  --Start Quanfacine 1 mg BID for impulsive behaviors.  7) Will continue  discharge planning as appropriate for patient.  8) Psychotherapy provided for 16-37 minutes.  Provided supportive therapy and insight-oriented therapy.    Treatment plan and medication risks and benefits discussed with: Patient     Psychotherapy Note  Total Time: 20 minutes  Participants: Pt and myself  Type Provided: Supportive and insight-oriented  Topic: Depression and impulsive thinking  Note: Patient was provided with reflective listening and open ended questions. Patient was able to voice his thoughts and ideas as to goal setting and problem solving without impulsive actions. Patient was also focused and needed redirection in regards to reality based ideas. Patient voiced that he does have big dreams which is appropriate and that he becomes inpatient at times  DX: As above  Progress: improving insight   Plan: Continue with psychotherapy individual and group, work on discharge plan     KE Hinton  03/10/20  10:47

## 2020-03-11 VITALS
HEIGHT: 66 IN | BODY MASS INDEX: 25.71 KG/M2 | DIASTOLIC BLOOD PRESSURE: 53 MMHG | RESPIRATION RATE: 18 BRPM | TEMPERATURE: 97 F | WEIGHT: 160 LBS | HEART RATE: 86 BPM | OXYGEN SATURATION: 97 % | SYSTOLIC BLOOD PRESSURE: 111 MMHG

## 2020-03-11 PROBLEM — R45.851 SUICIDAL IDEATION: Status: RESOLVED | Noted: 2020-03-06 | Resolved: 2020-03-11

## 2020-03-11 PROCEDURE — 99238 HOSP IP/OBS DSCHRG MGMT 30/<: CPT | Performed by: PSYCHIATRY & NEUROLOGY

## 2020-03-11 RX ORDER — QUETIAPINE FUMARATE 50 MG/1
50 TABLET, FILM COATED ORAL NIGHTLY
Qty: 30 TABLET | Refills: 0 | Status: SHIPPED | OUTPATIENT
Start: 2020-03-11 | End: 2020-03-27 | Stop reason: HOSPADM

## 2020-03-11 RX ORDER — GUANFACINE 1 MG/1
1 TABLET ORAL 2 TIMES DAILY
Qty: 60 TABLET | Refills: 0 | Status: SHIPPED | OUTPATIENT
Start: 2020-03-11 | End: 2020-03-27 | Stop reason: HOSPADM

## 2020-03-11 RX ORDER — SERTRALINE HYDROCHLORIDE 100 MG/1
100 TABLET, FILM COATED ORAL DAILY
Qty: 30 TABLET | Refills: 0 | Status: SHIPPED | OUTPATIENT
Start: 2020-03-12 | End: 2020-03-27 | Stop reason: HOSPADM

## 2020-03-11 RX ADMIN — GUANFACINE 1 MG: 1 TABLET ORAL at 08:38

## 2020-03-11 RX ADMIN — SERTRALINE HYDROCHLORIDE 100 MG: 50 TABLET ORAL at 08:38

## 2020-03-11 RX ADMIN — CYANOCOBALAMIN TAB 1000 MCG 1000 MCG: 1000 TAB at 08:38

## 2020-03-11 NOTE — PLAN OF CARE
Problem: Suicidal Behavior (Adult)  Goal: Suicidal Behavior is Absent/Minimized/Managed  Outcome: Ongoing (interventions implemented as appropriate)     Problem: Patient Care Overview  Goal: Plan of Care Review  Outcome: Ongoing (interventions implemented as appropriate)  Flowsheets (Taken 3/11/2020 9550)  Progress: improving  Plan of Care Reviewed With: patient  Patient Agreement with Plan of Care: agrees  Outcome Summary: Patient is cooperative and compliant with medications. Patient denies S/I, H/I and AVH at this time. Patient has been coming out of his room much more this evening. Patient has been interacting appropriately with staff and peers. Will continue to monitor and provide a safe environment.

## 2020-03-11 NOTE — NURSING NOTE
Behavior   Note any precipitants to event or behavior   Describe level and action of any aggressive behavior or speech and associated interventions.     Anxiety: Patient denies at this time  Depression: Patient denies at this time  Pain  0  AVH   no  S/I   no  Plan  no  H/I   no  Plan  no    Affect   euthymic/normal      Note: Patient in common area on assessment. He is alert and oriented. His affect is normal, he makes appropriate eye contact and interacts with staff and peers appropriately. He is cooperative and took all medications as ordered. He states he slept well and that he is going home today. Encouraged open communication with staff. Patient is agreeable to make needs know. No acute S/S of distress.       Intervention    PRN medication utilized:  no    Instructed in medication usage and effects  Medications administered as ordered  Encouraged to verbalize needs      Response    Verbalized understanding   Did patient take medications as ordered yes   Did patient interact with assessment?  yes     Plan    Will monitor for safety  Will monitor every 15 minutes as ordered  Will evaluate and promote the plan of care    Last BM:  unknown date  (Please chart in I/O as well)

## 2020-03-11 NOTE — NURSING NOTE
Patient discharged per MD order. AVS, prescriptions, follow up appointments and safety plan reviewed and copy given to patient. Prescriptions sent to [facility] pharmacy. All personal belongings returned to patient. Patient alert and oriented x4 and ambulatory upon discharge.
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
## 2020-03-11 NOTE — PLAN OF CARE
Problem: Patient Care Overview  Goal: Plan of Care Review  Outcome: Ongoing (interventions implemented as appropriate)  Flowsheets (Taken 3/11/2020 1235)  Progress: improving  Plan of Care Reviewed With: patient  Patient Agreement with Plan of Care: agrees

## 2020-03-11 NOTE — NURSING NOTE
Behavior   Note any precipitants to event or behavior   Describe level and action of any aggressive behavior or speech and associated interventions.     Anxiety: Patient denies at this time  Depression: Patient denies at this time  Pain  0  AVH   no  S/I   no  Plan  no  H/I   no  Plan  no    Affect   euthymic/normal      Note: Patient sleeping when signee entered room. Patient is cooperative and compliant with medications. Patient denies S/I, H/I and AVH at this time. Patient has been coming out of his room much more this evening. Patient has been interacting appropriately with staff and peers. Will continue to monitor and provide a safe environment.      Intervention    PRN medication utilized:  no    Instructed in medication usage and effects  Medications administered as ordered  Encouraged to verbalize needs      Response    Verbalized understanding   Did patient take medications as ordered yes   Did patient interact with assessment?  yes     Plan    Will monitor for safety  Will monitor every 15 minutes as ordered  Will evaluate and promote the plan of care    Last BM:    (Please chart in I/O as well)

## 2020-03-11 NOTE — SIGNIFICANT NOTE
"LCSW met with pt 1:1 and reviewed safety/dc plan. Pt presented in the dayroom, casually dressed, alert and oriented x3. Mood is good, affect is bright. Pt is pleasant and cooperative, makes good eye contact. Pt observed interacting well with other peers and staff on the unit. Pt denies SI/HI, AVH. Pt stated \"I think my medicine is working good, I am ready to go home.\" Pt future oriented and reports high level of motivation to get a job. Pt has participated well in individual and group tx, has been med compliant. Pt appears to be at baseline and stable for dc. PT does not appear to be an imminent risk to self or others. Pt denies access to firearms. Pt has follow up scheduled at [facility]. Pt educated on Crisis Hotline, advised to call as needed. BPRS completed upon dc.  "

## 2020-03-23 ENCOUNTER — HOSPITAL ENCOUNTER (EMERGENCY)
Facility: HOSPITAL | Age: 25
Discharge: PSYCHIATRIC OR SUBSTANCE USE FACILITY | End: 2020-03-24
Attending: EMERGENCY MEDICINE

## 2020-03-23 VITALS
HEART RATE: 108 BPM | TEMPERATURE: 98.7 F | OXYGEN SATURATION: 98 % | BODY MASS INDEX: 25.71 KG/M2 | DIASTOLIC BLOOD PRESSURE: 75 MMHG | RESPIRATION RATE: 18 BRPM | SYSTOLIC BLOOD PRESSURE: 126 MMHG | WEIGHT: 160 LBS | HEIGHT: 66 IN

## 2020-03-23 DIAGNOSIS — R45.851 DEPRESSION WITH SUICIDAL IDEATION: Primary | ICD-10-CM

## 2020-03-23 DIAGNOSIS — F32.A DEPRESSION WITH SUICIDAL IDEATION: Primary | ICD-10-CM

## 2020-03-23 LAB
ALBUMIN SERPL-MCNC: 4.5 G/DL (ref 3.5–5.2)
ALBUMIN/GLOB SERPL: 1.5 G/DL
ALP SERPL-CCNC: 100 U/L (ref 39–117)
ALT SERPL W P-5'-P-CCNC: 55 U/L (ref 1–41)
AMPHET+METHAMPHET UR QL: NEGATIVE
AMPHETAMINES UR QL: NEGATIVE
ANION GAP SERPL CALCULATED.3IONS-SCNC: 13 MMOL/L (ref 5–15)
APAP SERPL-MCNC: <5 MCG/ML (ref 10–30)
AST SERPL-CCNC: 24 U/L (ref 1–40)
BACTERIA UR QL AUTO: ABNORMAL /HPF
BARBITURATES UR QL SCN: NEGATIVE
BASOPHILS # BLD AUTO: 0.03 10*3/MM3 (ref 0–0.2)
BASOPHILS NFR BLD AUTO: 0.3 % (ref 0–1.5)
BENZODIAZ UR QL SCN: NEGATIVE
BILIRUB SERPL-MCNC: 0.7 MG/DL (ref 0.2–1.2)
BILIRUB UR QL STRIP: NEGATIVE
BUN BLD-MCNC: 10 MG/DL (ref 6–20)
BUN/CREAT SERPL: 11.4 (ref 7–25)
BUPRENORPHINE SERPL-MCNC: NEGATIVE NG/ML
CALCIUM SPEC-SCNC: 9.3 MG/DL (ref 8.6–10.5)
CANNABINOIDS SERPL QL: NEGATIVE
CHLORIDE SERPL-SCNC: 105 MMOL/L (ref 98–107)
CLARITY UR: CLEAR
CO2 SERPL-SCNC: 25 MMOL/L (ref 22–29)
COCAINE UR QL: NEGATIVE
COLOR UR: YELLOW
CREAT BLD-MCNC: 0.88 MG/DL (ref 0.76–1.27)
DEPRECATED RDW RBC AUTO: 38.9 FL (ref 37–54)
EOSINOPHIL # BLD AUTO: 0.24 10*3/MM3 (ref 0–0.4)
EOSINOPHIL NFR BLD AUTO: 2.4 % (ref 0.3–6.2)
ERYTHROCYTE [DISTWIDTH] IN BLOOD BY AUTOMATED COUNT: 12.8 % (ref 12.3–15.4)
ETHANOL BLD-MCNC: <10 MG/DL (ref 0–10)
ETHANOL UR QL: <0.01 %
GFR SERPL CREATININE-BSD FRML MDRD: 107 ML/MIN/1.73
GLOBULIN UR ELPH-MCNC: 3.1 GM/DL
GLUCOSE BLD-MCNC: 122 MG/DL (ref 65–99)
GLUCOSE UR STRIP-MCNC: NEGATIVE MG/DL
HCT VFR BLD AUTO: 46.7 % (ref 37.5–51)
HGB BLD-MCNC: 15.8 G/DL (ref 13–17.7)
HGB UR QL STRIP.AUTO: ABNORMAL
HOLD SPECIMEN: NORMAL
HOLD SPECIMEN: NORMAL
HYALINE CASTS UR QL AUTO: ABNORMAL /LPF
IMM GRANULOCYTES # BLD AUTO: 0.03 10*3/MM3 (ref 0–0.05)
IMM GRANULOCYTES NFR BLD AUTO: 0.3 % (ref 0–0.5)
KETONES UR QL STRIP: NEGATIVE
LEUKOCYTE ESTERASE UR QL STRIP.AUTO: NEGATIVE
LYMPHOCYTES # BLD AUTO: 3.47 10*3/MM3 (ref 0.7–3.1)
LYMPHOCYTES NFR BLD AUTO: 35.2 % (ref 19.6–45.3)
MCH RBC QN AUTO: 28.5 PG (ref 26.6–33)
MCHC RBC AUTO-ENTMCNC: 33.8 G/DL (ref 31.5–35.7)
MCV RBC AUTO: 84.1 FL (ref 79–97)
METHADONE UR QL SCN: NEGATIVE
MONOCYTES # BLD AUTO: 0.64 10*3/MM3 (ref 0.1–0.9)
MONOCYTES NFR BLD AUTO: 6.5 % (ref 5–12)
NEUTROPHILS # BLD AUTO: 5.46 10*3/MM3 (ref 1.7–7)
NEUTROPHILS NFR BLD AUTO: 55.3 % (ref 42.7–76)
NITRITE UR QL STRIP: NEGATIVE
NRBC BLD AUTO-RTO: 0 /100 WBC (ref 0–0.2)
OPIATES UR QL: NEGATIVE
OXYCODONE UR QL SCN: NEGATIVE
PCP UR QL SCN: NEGATIVE
PH UR STRIP.AUTO: 6.5 [PH] (ref 5–9)
PLATELET # BLD AUTO: 195 10*3/MM3 (ref 140–450)
PMV BLD AUTO: 12.5 FL (ref 6–12)
POTASSIUM BLD-SCNC: 3.9 MMOL/L (ref 3.5–5.2)
PROPOXYPH UR QL: NEGATIVE
PROT SERPL-MCNC: 7.6 G/DL (ref 6–8.5)
PROT UR QL STRIP: NEGATIVE
RBC # BLD AUTO: 5.55 10*6/MM3 (ref 4.14–5.8)
RBC # UR: ABNORMAL /HPF
REF LAB TEST METHOD: ABNORMAL
SALICYLATES SERPL-MCNC: <0.3 MG/DL
SODIUM BLD-SCNC: 143 MMOL/L (ref 136–145)
SP GR UR STRIP: 1.02 (ref 1–1.03)
SQUAMOUS #/AREA URNS HPF: ABNORMAL /HPF
TRICYCLICS UR QL SCN: NEGATIVE
UROBILINOGEN UR QL STRIP: ABNORMAL
WBC NRBC COR # BLD: 9.87 10*3/MM3 (ref 3.4–10.8)
WBC UR QL AUTO: ABNORMAL /HPF
WHOLE BLOOD HOLD SPECIMEN: NORMAL
WHOLE BLOOD HOLD SPECIMEN: NORMAL

## 2020-03-23 PROCEDURE — 85025 COMPLETE CBC W/AUTO DIFF WBC: CPT | Performed by: EMERGENCY MEDICINE

## 2020-03-23 PROCEDURE — 80307 DRUG TEST PRSMV CHEM ANLYZR: CPT | Performed by: EMERGENCY MEDICINE

## 2020-03-23 PROCEDURE — 81001 URINALYSIS AUTO W/SCOPE: CPT | Performed by: EMERGENCY MEDICINE

## 2020-03-23 PROCEDURE — 36415 COLL VENOUS BLD VENIPUNCTURE: CPT

## 2020-03-23 PROCEDURE — 80053 COMPREHEN METABOLIC PANEL: CPT | Performed by: EMERGENCY MEDICINE

## 2020-03-23 PROCEDURE — 99283 EMERGENCY DEPT VISIT LOW MDM: CPT

## 2020-03-24 ENCOUNTER — HOSPITAL ENCOUNTER (INPATIENT)
Facility: HOSPITAL | Age: 25
LOS: 3 days | Discharge: HOME | End: 2020-03-27
Attending: PSYCHIATRY & NEUROLOGY | Admitting: PSYCHIATRY & NEUROLOGY

## 2020-03-24 PROBLEM — R45.851 SUICIDAL IDEATION: Status: ACTIVE | Noted: 2020-03-24

## 2020-03-24 PROCEDURE — 99223 1ST HOSP IP/OBS HIGH 75: CPT | Performed by: PSYCHIATRY & NEUROLOGY

## 2020-03-24 PROCEDURE — 99232 SBSQ HOSP IP/OBS MODERATE 35: CPT | Performed by: FAMILY MEDICINE

## 2020-03-24 PROCEDURE — 90833 PSYTX W PT W E/M 30 MIN: CPT | Performed by: PSYCHIATRY & NEUROLOGY

## 2020-03-24 RX ORDER — ALUMINA, MAGNESIA, AND SIMETHICONE 2400; 2400; 240 MG/30ML; MG/30ML; MG/30ML
15 SUSPENSION ORAL EVERY 6 HOURS PRN
Status: DISCONTINUED | OUTPATIENT
Start: 2020-03-24 | End: 2020-03-27 | Stop reason: HOSPADM

## 2020-03-24 RX ORDER — QUETIAPINE FUMARATE 25 MG/1
50 TABLET, FILM COATED ORAL NIGHTLY
Status: DISCONTINUED | OUTPATIENT
Start: 2020-03-24 | End: 2020-03-24

## 2020-03-24 RX ORDER — ACETAMINOPHEN 325 MG/1
650 TABLET ORAL EVERY 4 HOURS PRN
Status: DISCONTINUED | OUTPATIENT
Start: 2020-03-24 | End: 2020-03-27 | Stop reason: HOSPADM

## 2020-03-24 RX ORDER — CLONIDINE HYDROCHLORIDE 0.1 MG/1
0.1 TABLET ORAL EVERY 4 HOURS PRN
Status: DISCONTINUED | OUTPATIENT
Start: 2020-03-24 | End: 2020-03-27 | Stop reason: HOSPADM

## 2020-03-24 RX ORDER — LANOLIN ALCOHOL/MO/W.PET/CERES
1000 CREAM (GRAM) TOPICAL DAILY
Status: DISCONTINUED | OUTPATIENT
Start: 2020-03-24 | End: 2020-03-27 | Stop reason: HOSPADM

## 2020-03-24 RX ORDER — LOPERAMIDE HYDROCHLORIDE 2 MG/1
2 CAPSULE ORAL
Status: DISCONTINUED | OUTPATIENT
Start: 2020-03-24 | End: 2020-03-27 | Stop reason: HOSPADM

## 2020-03-24 RX ORDER — NICOTINE 21 MG/24HR
1 PATCH, TRANSDERMAL 24 HOURS TRANSDERMAL EVERY 24 HOURS
Status: DISCONTINUED | OUTPATIENT
Start: 2020-03-24 | End: 2020-03-27 | Stop reason: HOSPADM

## 2020-03-24 RX ORDER — TOPIRAMATE 25 MG/1
25 TABLET ORAL NIGHTLY
Status: DISCONTINUED | OUTPATIENT
Start: 2020-03-24 | End: 2020-03-27 | Stop reason: HOSPADM

## 2020-03-24 RX ORDER — GUANFACINE 1 MG/1
1 TABLET ORAL 2 TIMES DAILY
Status: DISCONTINUED | OUTPATIENT
Start: 2020-03-24 | End: 2020-03-24

## 2020-03-24 RX ORDER — HYDROXYZINE PAMOATE 50 MG/1
50 CAPSULE ORAL EVERY 6 HOURS PRN
Status: DISCONTINUED | OUTPATIENT
Start: 2020-03-24 | End: 2020-03-27 | Stop reason: HOSPADM

## 2020-03-24 RX ORDER — TRAZODONE HYDROCHLORIDE 50 MG/1
50 TABLET ORAL NIGHTLY PRN
Status: DISCONTINUED | OUTPATIENT
Start: 2020-03-24 | End: 2020-03-27 | Stop reason: HOSPADM

## 2020-03-24 RX ORDER — ESCITALOPRAM OXALATE 5 MG/1
2.5 TABLET ORAL DAILY
Status: DISCONTINUED | OUTPATIENT
Start: 2020-03-25 | End: 2020-03-25

## 2020-03-24 RX ADMIN — CYANOCOBALAMIN TAB 1000 MCG 1000 MCG: 1000 TAB at 09:44

## 2020-03-24 RX ADMIN — QUETIAPINE FUMARATE 50 MG: 25 TABLET ORAL at 20:33

## 2020-03-24 RX ADMIN — NICOTINE POLACRILEX 2 MG: 2 GUM, CHEWING BUCCAL at 18:12

## 2020-03-24 RX ADMIN — GUANFACINE 1 MG: 1 TABLET ORAL at 20:35

## 2020-03-24 RX ADMIN — NICOTINE POLACRILEX 2 MG: 2 GUM, CHEWING BUCCAL at 20:33

## 2020-03-24 RX ADMIN — TOPIRAMATE 25 MG: 25 TABLET, FILM COATED ORAL at 20:51

## 2020-03-24 RX ADMIN — NICOTINE POLACRILEX 2 MG: 2 GUM, CHEWING BUCCAL at 14:35

## 2020-03-24 NOTE — PLAN OF CARE
Problem: Patient Care Overview  Goal: Plan of Care Review  Outcome: Ongoing (interventions implemented as appropriate)  Flowsheets (Taken 3/24/2020 0617)  Progress: no change  Plan of Care Reviewed With: patient  Patient Agreement with Plan of Care: agrees  Outcome Summary: New admission. Pt has rested well thus far.

## 2020-03-24 NOTE — CONSULTS
CHIEF COMPLAINT/REASON FOR VISIT:  Suicidal Ideation    HPI:  Patient presented to our ED with the above complaint on March 23 at around 8:30 PM.  He reported that he is been at [facility] and he has previously had been admitted to this behavioral health unit 3 times with the most recent one March 6 of this year.  His plan is to cut his wrists to end his life but he has not done this recently.  He arrived on the behavioral health unit on March 24 at just after midnight was quiet and cooperative.    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 Indications: Inadequate Vitamin B12 30 tablet 0    • guanFACINE (TENEX) 1 MG tablet Take 1 tablet by mouth 2 (Two) Times a Day. Indications: impulsivity and hyperactivity 60 tablet 0    • QUEtiapine (SEROquel) 50 MG tablet Take 1 tablet by mouth Every Night. Indications: Major Depressive Disorder 30 tablet 0 3/22/2020   • sertraline (ZOLOFT) 100 MG tablet Take 1 tablet by mouth Daily. Indications: Major Depressive Disorder 30 tablet 0 3/22/2020       ALLERGIES:  Adderall [amphetamine-dextroamphetamine]; Amoxicillin; Flexeril [cyclobenzaprine]; Penicillins; and Benadryl [diphenhydramine hcl (sleep)]      PAST MEDICAL/SURGICAL HISTORY:  Past Medical History:   Diagnosis Date   • ADHD (attention deficit hyperactivity disorder)    • Anxiety    • Depression    • PTSD (post-traumatic stress disorder)        Past Surgical History:   Procedure Laterality Date   • DENTAL PROCEDURE         Review of Systems  "  Constitutional: Negative for activity change, appetite change, fatigue and fever.   HENT: Negative for congestion, ear discharge, ear pain, facial swelling, hearing loss, nosebleeds, postnasal drip, rhinorrhea, sinus pressure, sore throat, tinnitus and trouble swallowing.    Eyes: Negative for pain, discharge and visual disturbance.   Respiratory: Negative for cough, shortness of breath and wheezing.    Cardiovascular: Negative for chest pain, palpitations and leg swelling.   Gastrointestinal: Negative for abdominal pain, blood in stool, constipation, diarrhea, nausea and vomiting.   Genitourinary: Negative for difficulty urinating, discharge, dysuria, flank pain, frequency, hematuria, penile pain, penile swelling, scrotal swelling, testicular pain and urgency.   Musculoskeletal: Negative for arthralgias, back pain, joint swelling, myalgias and neck pain.   Skin: Negative for rash and wound.   Neurological: Negative for dizziness, seizures, syncope, weakness, light-headedness and headaches.   Hematological: Negative for adenopathy.       Social History     Socioeconomic History   • Marital status: Single     Spouse name: Not on file   • Number of children: Not on file   • Years of education: Not on file   • Highest education level: Not on file   Tobacco Use   • Smoking status: Current Every Day Smoker     Packs/day: 1.00     Types: Cigarettes   • Smokeless tobacco: Former User   Substance and Sexual Activity   • Alcohol use: No   • Drug use: No   • Sexual activity: Defer       Family History   Problem Relation Age of Onset   • Diabetes Mother    • Alcohol abuse Mother    • No Known Problems Father    • Mental illness Paternal Uncle    • Alcohol abuse Paternal Uncle    • Alcohol abuse Paternal Uncle    • Suicide Attempts Neg Hx              Objective     /60 (BP Location: Left arm, Patient Position: Sitting)   Pulse 91   Temp 96.9 °F (36.1 °C) (Tympanic)   Resp 18   Ht 167.6 cm (65.98\")   Wt 72.6 kg (160 " lb)   SpO2 99%   BMI 25.84 kg/m²     Physical Exam   Constitutional: He appears well-developed and well-nourished.   HENT:   Head: Normocephalic and atraumatic.   Eyes: Conjunctivae and EOM are normal.   Neck: Normal range of motion. Neck supple. No thyromegaly present.   Cardiovascular: Normal rate, regular rhythm and normal heart sounds. Exam reveals no gallop and no friction rub.   No murmur heard.  Pulmonary/Chest: Effort normal and breath sounds normal. No respiratory distress. He has no wheezes. He has no rales.   Abdominal: Soft. He exhibits no distension and no mass. There is no tenderness. There is no rebound and no guarding.   Musculoskeletal: Normal range of motion.   Lymphadenopathy:     He has no cervical adenopathy.   Neurological: He is alert. He has normal strength. He displays no tremor and normal reflexes. No sensory deficit. He exhibits normal muscle tone. Coordination normal. He displays no Babinski's sign on the right side. He displays no Babinski's sign on the left side.   Reflex Scores:       Tricep reflexes are 2+ on the right side and 2+ on the left side.       Bicep reflexes are 2+ on the right side and 2+ on the left side.       Brachioradialis reflexes are 2+ on the right side and 2+ on the left side.       Patellar reflexes are 2+ on the right side and 2+ on the left side.       Achilles reflexes are 2+ on the right side and 2+ on the left side.  CN II: Visual fields intact  CN III,IV,VI: extraocular movements intact  CN V: Masseter strength and sensation in all three divisions intact  CN VII: Smile and eyelid closure symmetrical  CN VIII: Hearing intact  CN IX and X: Voice and palate movement intact  CN XI: Shoulder shrug intact  CN XII: Tongue protrusion and movement intact   Skin: Skin is warm and dry. No rash noted. No erythema.   Nursing note and vitals reviewed.      Dystonia/Tardive Dyskinesia  Absent  Meningeal Signs  Absent    Diagnostic Studies  CBC, CMP,TSH, UDS,  acetaminophen level, salicylate level, ethanol level, U/A all normal except    COMPREHENSIVE METABOLIC PANEL - Abnormal; Notable for the following components:       Result Value      Glucose 122 (*)       ALT (SGPT) 55 (*)       All other components within normal limits     Narrative:      GFR Normal >60  Chronic Kidney Disease <60  Kidney Failure <15      ACETAMINOPHEN LEVEL - Abnormal; Notable for the following components:     Acetaminophen <5.0 (*)       All other components within normal limits   CBC WITH AUTO DIFFERENTIAL - Abnormal; Notable for the following components:     MPV 12.5 (*)       Lymphocytes, Absolute 3.47 (*)       All other components within normal limits   URINALYSIS W/ MICROSCOPIC IF INDICATED (NO CULTURE) - Abnormal; Notable for the following components:     Blood, UA Trace (*)       All other components within normal limits   URINALYSIS, MICROSCOPIC ONLY - Abnormal; Notable for the following components:     RBC, UA 6-12 (*)       All other components within normal limits   SALICYLATE LEVEL - Normal   URINE DRUG SCREEN - Normal   Ethanol less than 10  TSH normal at 1.82 on March 6, 2020.    Assessment/Plan     Past Medical History:   Diagnosis Date   • ADHD (attention deficit hyperactivity disorder)    • Anxiety    • Depression    • PTSD (post-traumatic stress disorder)      Hematuria is chronic and suggest outpatient evaluation as planned.    Hyperglycemia, mild.  With recent hemoglobin A1cs normal, suggest no further evaluation.    Single elevation of liver function test, mild.  Suggest no further evaluation.      Continue Home Meds as ordered. Mental health and pain issues managed per psychiatry.  Further diagnostic studies or intervention based on hospital course.

## 2020-03-24 NOTE — NURSING NOTE
Tyrel was admitted to room 659 from ED per wheelchair. He is admitted for suicidal thought with plan to cut his wrists. He has signed all admission papers. He denies SI/HI/AVH or physical discomfort at this time. He c/o hunger and being tired. After eating sandwich, he went to sleep at approx. 0145.

## 2020-03-24 NOTE — NURSING NOTE
"Review of Current Symptoms--only current symptoms        · General   Good general health lately    · Eyes    glasses/contact lens                \"I need to get glasses, I can't see far away\"    · ENT/Mouth    None    · Cardio    None    · Resp    None    · GI     None    ·     None    · MS    None    · Skin/Hair/Nails    None    · Neuro    None    "

## 2020-03-24 NOTE — NURSING NOTE
Behavior   Note any precipitants to event or behavior   Describe level and action of any aggressive behavior or speech and associated interventions.     Anxiety: Patient denies at this time  Depression: Patient denies at this time  Pain  0  AVH   no  S/I   no  Plan  no  H/I   no  Plan  no    Affect   euthymic/normal      Note:  Pt states he is feeling better.  Denies depression, SI/HI/AVH.        Intervention    PRN medication utilized:  no    Instructed in medication usage and effects  Medications administered as ordered  Encouraged to verbalize needs      Response    Verbalized understanding   Did patient take medications as ordered yes  Did patient interact with assessment?  yes     Plan    Will monitor for safety  Will monitor every 15 minutes as ordered  Will evaluate and promote the plan of care    Last BM:  03/23/2020  (Please chart in I/O as well)

## 2020-03-24 NOTE — ED PROVIDER NOTES
Subjective   Patient presents emergency department with complaint of suicidal ideation and requesting psychiatric evaluation.  Patient has been on medication before for his PTSD and ADHD as well as anxiety and depression.  Patient states he has been on his medications though they have been making him feel worse.  Patient has been seen both at the [facility] as well as on the behavioral health floor.  Patient is asking for some place to go, like [facility], where they may be able to help him.  Patient has any fevers or chills.  No nausea vomiting, no chest pain or shortness of breath, no systemic symptoms.  Patient states his plan for suicide is the cut his wrists.  States he attempted to do this before he came, though there is no markings on his wrist at this time.          Review of Systems   Constitutional: Negative.  Negative for appetite change, chills and fever.   HENT: Negative.  Negative for congestion.    Eyes: Negative.  Negative for photophobia and visual disturbance.   Respiratory: Negative.  Negative for cough, chest tightness and shortness of breath.    Cardiovascular: Negative.  Negative for chest pain and palpitations.   Gastrointestinal: Negative.  Negative for abdominal pain, constipation, diarrhea, nausea and vomiting.   Endocrine: Negative.    Genitourinary: Negative.  Negative for decreased urine volume, dysuria, flank pain and hematuria.   Musculoskeletal: Negative.  Negative for arthralgias, back pain, myalgias, neck pain and neck stiffness.   Skin: Negative.  Negative for pallor.   Neurological: Negative.  Negative for dizziness, syncope, weakness, light-headedness, numbness and headaches.   Psychiatric/Behavioral: Positive for agitation and suicidal ideas. Negative for confusion. The patient is nervous/anxious.    All other systems reviewed and are negative.      Past Medical History:   Diagnosis Date   • ADHD (attention deficit hyperactivity disorder)    • Anxiety    •  Depression    • PTSD (post-traumatic stress disorder)        Allergies   Allergen Reactions   • Adderall [Amphetamine-Dextroamphetamine] Other (See Comments)     Pt states its redness all over    • Amoxicillin Unknown - Low Severity   • Flexeril [Cyclobenzaprine] Hives   • Penicillins Other (See Comments)     Pt doesn't know the exact side affect    • Benadryl [Diphenhydramine Hcl (Sleep)] Rash       Past Surgical History:   Procedure Laterality Date   • DENTAL PROCEDURE         Family History   Problem Relation Age of Onset   • Diabetes Mother    • Alcohol abuse Mother    • No Known Problems Father    • Mental illness Paternal Uncle    • Alcohol abuse Paternal Uncle    • Alcohol abuse Paternal Uncle    • Suicide Attempts Neg Hx        Social History     Socioeconomic History   • Marital status: Single     Spouse name: Not on file   • Number of children: Not on file   • Years of education: Not on file   • Highest education level: Not on file   Tobacco Use   • Smoking status: Current Every Day Smoker     Packs/day: 1.00     Types: Cigarettes   • Smokeless tobacco: Former User   Substance and Sexual Activity   • Alcohol use: No   • Drug use: No   • Sexual activity: Defer           Objective   Physical Exam   Constitutional: He is oriented to person, place, and time. He appears well-developed and well-nourished. No distress.   HENT:   Head: Normocephalic and atraumatic.   Eyes: Conjunctivae and EOM are normal.   Neck: Normal range of motion. Neck supple. No JVD present.   Cardiovascular: Normal rate, regular rhythm, normal heart sounds and intact distal pulses. Exam reveals no gallop and no friction rub.   No murmur heard.  Pulmonary/Chest: Effort normal. No respiratory distress. He has no wheezes. He has no rales. He exhibits no tenderness.   Abdominal: Soft. Bowel sounds are normal. He exhibits no distension and no mass. There is no tenderness. There is no rebound and no guarding.   Musculoskeletal: Normal range of  motion.   Neurological: He is alert and oriented to person, place, and time.   Skin: Skin is warm and dry. Capillary refill takes less than 2 seconds.   Psychiatric: His speech is normal and behavior is normal. Judgment normal. His mood appears anxious. Cognition and memory are normal. He exhibits a depressed mood. He expresses suicidal ideation. He expresses suicidal plans.   Nursing note and vitals reviewed.      Procedures           ED Course                                 Labs Reviewed   COMPREHENSIVE METABOLIC PANEL - Abnormal; Notable for the following components:       Result Value    Glucose 122 (*)     ALT (SGPT) 55 (*)     All other components within normal limits    Narrative:     GFR Normal >60  Chronic Kidney Disease <60  Kidney Failure <15     ACETAMINOPHEN LEVEL - Abnormal; Notable for the following components:    Acetaminophen <5.0 (*)     All other components within normal limits   CBC WITH AUTO DIFFERENTIAL - Abnormal; Notable for the following components:    MPV 12.5 (*)     Lymphocytes, Absolute 3.47 (*)     All other components within normal limits   URINALYSIS W/ MICROSCOPIC IF INDICATED (NO CULTURE) - Abnormal; Notable for the following components:    Blood, UA Trace (*)     All other components within normal limits   URINALYSIS, MICROSCOPIC ONLY - Abnormal; Notable for the following components:    RBC, UA 6-12 (*)     All other components within normal limits   SALICYLATE LEVEL - Normal   URINE DRUG SCREEN - Normal    Narrative:     Cutoff For Drugs Screened:    Amphetamines               500 ng/ml  Barbiturates               200 ng/ml  Benzodiazepines            150 ng/ml  Cocaine                    150 ng/ml  Methadone                  200 ng/ml  Opiates                    100 ng/ml  Phencyclidine               25 ng/ml  THC                            50 ng/ml  Methamphetamine            500 ng/ml  Tricyclic Antidepressants  300 ng/ml  Oxycodone                  100 ng/ml  Propoxyphene                300 ng/ml  Buprenorphine               10 ng/ml    The normal value for all drugs tested is negative. This report includes unconfirmed screening results, with the cutoff values listed, to be used for medical treatment purposes only.  Unconfirmed results must not be used for non-medical purposes such as employment or legal testing.  Clinical consideration should be applied to any drug of abuse test, particularly when unconfirmed results are used.     RAINBOW DRAW    Narrative:     The following orders were created for panel order [place] Draw.  Procedure                               Abnormality         Status                     ---------                               -----------         ------                     Light Blue Top[[id number]]                                   Final result               Green Top (Gel)[[id number]]                                  Final result               Lavender Top[[id number]]                                     Final result               Gold Top - SST[[id number]]                                   Final result                 Please view results for these tests on the individual orders.   ETHANOL   CBC AND DIFFERENTIAL    Narrative:     The following orders were created for panel order CBC & Differential.  Procedure                               Abnormality         Status                     ---------                               -----------         ------                     CBC Auto Differential[[id number]]        Abnormal            Final result                 Please view results for these tests on the individual orders.   LIGHT BLUE TOP   GREEN TOP   LAVENDER TOP   GOLD TOP - SST       No orders to display       Seen and evaluated by behavioral health.  Patient will be admitted for further psychiatric care.        [facility]    Final diagnoses:   Depression with suicidal ideation            Satish Santos MD  03/23/20 8540
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
## 2020-03-24 NOTE — ED NOTES
Pt states he was dc'd from this facility and was not able to fill his meds for a couple days due to cousins work schedule  Pt states he stared taking meds for about a week the he bacame depressed and stressed out. Pt denies suicidal and homicidial ideations at this time     Satish Moran, RN  03/23/20 2139       Satish Moran RN  03/23/20 2140

## 2020-03-25 PROBLEM — R45.89 INEFFECTIVE COPING: Status: ACTIVE | Noted: 2020-03-25

## 2020-03-25 PROBLEM — R41.83 BORDERLINE INTELLECTUAL FUNCTIONING: Status: ACTIVE | Noted: 2020-03-25

## 2020-03-25 PROBLEM — F54 PERSONALITY TRAITS AFFECTING MEDICAL CONDITION: Status: ACTIVE | Noted: 2020-03-25

## 2020-03-25 PROBLEM — F17.200 NICOTINE USE DISORDER: Status: ACTIVE | Noted: 2020-03-25

## 2020-03-25 PROBLEM — F63.9 IMPULSE CONTROL DISORDER: Status: ACTIVE | Noted: 2020-03-25

## 2020-03-25 PROCEDURE — 93005 ELECTROCARDIOGRAM TRACING: CPT | Performed by: PSYCHIATRY & NEUROLOGY

## 2020-03-25 PROCEDURE — 99232 SBSQ HOSP IP/OBS MODERATE 35: CPT | Performed by: PSYCHIATRY & NEUROLOGY

## 2020-03-25 PROCEDURE — 93010 ELECTROCARDIOGRAM REPORT: CPT | Performed by: INTERNAL MEDICINE

## 2020-03-25 RX ORDER — ESCITALOPRAM OXALATE 5 MG/1
5 TABLET ORAL DAILY
Status: DISCONTINUED | OUTPATIENT
Start: 2020-03-26 | End: 2020-03-27 | Stop reason: HOSPADM

## 2020-03-25 RX ADMIN — ESCITALOPRAM 2.5 MG: 5 TABLET, FILM COATED ORAL at 08:28

## 2020-03-25 RX ADMIN — CYANOCOBALAMIN TAB 1000 MCG 1000 MCG: 1000 TAB at 08:28

## 2020-03-25 RX ADMIN — NICOTINE POLACRILEX 2 MG: 2 GUM, CHEWING BUCCAL at 15:30

## 2020-03-25 RX ADMIN — NICOTINE POLACRILEX 2 MG: 2 GUM, CHEWING BUCCAL at 20:54

## 2020-03-25 RX ADMIN — TOPIRAMATE 25 MG: 25 TABLET, FILM COATED ORAL at 20:48

## 2020-03-25 RX ADMIN — NICOTINE POLACRILEX 2 MG: 2 GUM, CHEWING BUCCAL at 18:22

## 2020-03-25 RX ADMIN — NICOTINE POLACRILEX 2 MG: 2 GUM, CHEWING BUCCAL at 08:28

## 2020-03-25 NOTE — PROGRESS NOTES
3/25/2020    Chief Complaint: suicidal ideation and depression    Subjective:  Patient is a 24 y.o. male  That is currently inpatient on the adult BHU  Today he comes to treatment team and is seen sitting out with peers. He is appropriate, alert/oriented, although at times he can get carried away but is easily redirected.   Patient reports mood is better and he has a more hopeful future. He believes that medications caused him to become suicidal and is wanting to be more aware of the dosages.    Patient is attending groups, denies SI/HI/AVH. He reports that he is going to [employer] [facility] [employer] at discharge.  He is hopeful to learn a trade and be able to work.   Pt is compliant with medications and no AE reported.   Objective     Vital Signs    Temp:  [97 °F (36.1 °C)-97.7 °F (36.5 °C)] 97 °F (36.1 °C)  Heart Rate:  [] 81  Resp:  [18] 18  BP: ()/(50-92) 90/50    Physical Exam:   General Appearance: alert, appears stated age and cooperative,  Hygiene:   good  Gait & Station: Normal  Musculoskeletal: No tremors or abnormal involuntary movements    Mental Status Exam:   Cooperation:  Cooperative  Eye Contact:  Good  Psychomotor Behavior:  Appropriate  Mood: Improving  Affect:  mood-congruent  Speech:  Normal  Thought Process:  Coherent and Appropriately abstract  Associations: Goal Directed  Thought Content:     Mood congruent   Suicidal:  None   Homicidal:  None   Hallucinations:  None   Delusion:  None  Cognitive Functioning:   Consciousness: awake, alert and oriented  Reliability:  fair  Insight:  Fair  Judgement:  Poor  Impulse Control:  Poor and improving    Lab Results (last 24 hours)     ** No results found for the last 24 hours. **        Imaging Results (Last 24 Hours)     ** No results found for the last 24 hours. **          Medicine:   Current Facility-Administered Medications:   •  acetaminophen (TYLENOL) tablet 650 mg, 650 mg, Oral, Q4H PRN, Hiram Fox II, MD  •   aluminum-magnesium hydroxide-simethicone (MAALOX MAX) 400-400-40 MG/5ML suspension 15 mL, 15 mL, Oral, Q6H PRN, Hiram Fox II, MD  •  cloNIDine (CATAPRES) tablet 0.1 mg, 0.1 mg, Oral, Q4H PRN, Hiram Fox II, MD  •  escitalopram (LEXAPRO) tablet 2.5 mg, 2.5 mg, Oral, Daily, Hiram Fox II, MD, 2.5 mg at 03/25/20 0828  •  hydrOXYzine pamoate (VISTARIL) capsule 50 mg, 50 mg, Oral, Q6H PRN, Hiram Fox II, MD  •  loperamide (IMODIUM) capsule 2 mg, 2 mg, Oral, Q2H PRN, Hiram Fox II, MD  •  magnesium hydroxide (MILK OF MAGNESIA) suspension 2400 mg/10mL 10 mL, 10 mL, Oral, Daily PRN, Hiram Fox II, MD  •  nicotine (NICODERM CQ) 21 MG/24HR patch 1 patch, 1 patch, Transdermal, Q24H, Hiram Fox II, MD  •  nicotine polacrilex (NICORETTE) gum 2 mg, 2 mg, Mouth/Throat, Q2H PRN, Hiram Fox II, MD, 2 mg at 03/25/20 0828  •  topiramate (TOPAMAX) tablet 25 mg, 25 mg, Oral, Nightly, Hiram Fox II, MD, 25 mg at 03/24/20 2051  •  traZODone (DESYREL) tablet 50 mg, 50 mg, Oral, Nightly PRN, Hiram Fox II, MD  •  vitamin B-12 (CYANOCOBALAMIN) tablet 1,000 mcg, 1,000 mcg, Oral, Daily, Hiram Fox II, MD, 1,000 mcg at 03/25/20 0828    Diagnoses/Assessment:     Suicidal ideation      Treatment Plan:    1) Will continue care for the patient on the behavioral health unit at [facility] to ensure patient safety.  2) Will continue to provide treatment with the unit milieu, activities, therapies and psychopharmacological management.  3) Patient to be placed on or continued on  Q15 minute checks  and Suicide precautions.  4) Pertinent medical issues: none  5) Will order following labs: none  6) Will make the following medication changes:   --Cont Topamax 25 mg nightly   --Increase Lexapro to 5 mg daily   7) Will continue discharge planning as appropriate for patient.  8) Psychotherapy provided for less than 16  minutes.    Treatment plan and medication risks and benefits discussed with: Patient    KE Hinton  03/25/20  15:22

## 2020-03-25 NOTE — PLAN OF CARE
"  Problem: Patient Care Overview  Goal: Plan of Care Review  Outcome: Ongoing (interventions implemented as appropriate)  Flowsheets (Taken 3/25/2020 1714)  Progress: improving  Plan of Care Reviewed With: patient  Patient Agreement with Plan of Care: agrees  Outcome Summary: Patient is cooperative and compliant with medications. Patient denies S/I, H/I and AVH. Patient reports feeling better and stated  \"If I start to feel that way again I will let you know.\"     "

## 2020-03-25 NOTE — NURSING NOTE
"Behavior   Note any precipitants to event or behavior   Describe level and action of any aggressive behavior or speech and associated interventions.     Anxiety: Patient denies at this time  Depression: Patient denies at this time  Pain  0  AVH   no  S/I   no  Plan  no  H/I   no  Plan  no    Affect   euthymic/normal      Note:Patient sitting in dining area interacting appropriately with staff and peers. Patient is cooperative and compliant with medications. Patient denies S/I, H/I and AVH. Patient reports feeling better and stated  \"If I start to feel that way again I will let you know.\"      Intervention    PRN medication utilized:  yes - Nicotone gum    Instructed in medication usage and effects  Medications administered as ordered  Encouraged to verbalize needs      Response    Verbalized understanding   Did patient take medications as ordered yes   Did patient interact with assessment?  yes     Plan    Will monitor for safety  Will monitor every 15 minutes as ordered  Will evaluate and promote the plan of care    Last BM:    (Please chart in I/O as well)    "

## 2020-03-25 NOTE — PLAN OF CARE
Problem: Patient Care Overview  Goal: Plan of Care Review  Outcome: Ongoing (interventions implemented as appropriate)  Flowsheets (Taken 3/25/2020 5440)  Progress: improving  Plan of Care Reviewed With: patient  Patient Agreement with Plan of Care: agrees  Outcome Summary: Pt states was depressed this morning, dealing with some anger but much better this afternoon and participating in groups.  Denies SI.
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
## 2020-03-25 NOTE — NURSING NOTE
Behavior   Note any precipitants to event or behavior   Describe level and action of any aggressive behavior or speech and associated interventions.     Anxiety: Patient denies at this time  Depression: depressed mood  Pain  0  AVH   no  S/I   no  Plan  no  H/I   no  Plan  no    Affect   labile      Note:  Pt's mood this am was very depressed, poor eye contact, not wanting to talk much, this afternoon pt interacting with peers, high energy, participated actively in group.        Intervention    PRN medication utilized:  no    Instructed in medication usage and effects  Medications administered as ordered  Encouraged to verbalize needs      Response    Verbalized understanding   Did patient take medications as ordered yes   Did patient interact with assessment?  yes     Plan    Will monitor for safety  Will monitor every 15 minutes as ordered  Will evaluate and promote the plan of care    Last BM:  3/23/2020  (Please chart in I/O as well)

## 2020-03-26 LAB — TSH SERPL DL<=0.05 MIU/L-ACNC: 1.21 UIU/ML (ref 0.27–4.2)

## 2020-03-26 PROCEDURE — 99232 SBSQ HOSP IP/OBS MODERATE 35: CPT | Performed by: PSYCHIATRY & NEUROLOGY

## 2020-03-26 PROCEDURE — 84443 ASSAY THYROID STIM HORMONE: CPT | Performed by: PSYCHIATRY & NEUROLOGY

## 2020-03-26 RX ADMIN — NICOTINE POLACRILEX 2 MG: 2 GUM, CHEWING BUCCAL at 20:38

## 2020-03-26 RX ADMIN — TOPIRAMATE 25 MG: 25 TABLET, FILM COATED ORAL at 20:34

## 2020-03-26 RX ADMIN — NICOTINE POLACRILEX 2 MG: 2 GUM, CHEWING BUCCAL at 08:42

## 2020-03-26 RX ADMIN — ESCITALOPRAM 5 MG: 5 TABLET, FILM COATED ORAL at 08:42

## 2020-03-26 RX ADMIN — CYANOCOBALAMIN TAB 1000 MCG 1000 MCG: 1000 TAB at 08:42

## 2020-03-26 RX ADMIN — NICOTINE POLACRILEX 2 MG: 2 GUM, CHEWING BUCCAL at 13:03

## 2020-03-26 NOTE — PROGRESS NOTES
"3/26/2020    Chief Complaint: suicidal ideation    Subjective:  Patient is a 24 y.o. male that is currently inpatient on the adult U  Patient reports that he is feeling more hopeful, he has plans to go to [employer] at discharge and live at [place]. He is hoping to learn a trade. Patient reports that he slept well, no SI/HI/AVH  He is alert/oriented  Patient is compliant with medications, no GI distress reported.   He is pleasant but can be intrusive towards others.     Objective     Vital Signs    Temp:  [97.8 °F (36.6 °C)-98.7 °F (37.1 °C)] 97.8 °F (36.6 °C)  Heart Rate:  [] 77  Resp:  [16-18] 18  BP: ()/(50-63) 135/60    Physical Exam:   General Appearance: alert, appears stated age and cooperative,  Hygiene:   good  Gait & Station: Normal  Musculoskeletal: No tremors or abnormal involuntary movements    Mental Status Exam:   Cooperation:  Cooperative  Eye Contact:  Good  Psychomotor Behavior:  Appropriate  Mood: \"Fine\"  Affect:  normal  Speech:  Normal  Thought Process:  Coherent  Associations: Goal Directed  Thought Content:     Normal   Suicidal:  None   Homicidal:  None   Hallucinations:  None   Delusion:  None  Cognitive Functioning:   Consciousness: awake, alert and oriented  Reliability:  fair  Insight:  Fair  Judgement:  Poor  Impulse Control:  Fair    Lab Results (last 24 hours)     Procedure Component Value Units Date/Time    TSH [[id number]]  (Normal) Collected:  03/26/20 0545    Specimen:  Blood Updated:  03/26/20 0721     TSH 1.210 uIU/mL         Imaging Results (Last 24 Hours)     ** No results found for the last 24 hours. **          Medicine:   Current Facility-Administered Medications:   •  acetaminophen (TYLENOL) tablet 650 mg, 650 mg, Oral, Q4H PRN, Hiram Fox II, MD  •  aluminum-magnesium hydroxide-simethicone (MAALOX MAX) 400-400-40 MG/5ML suspension 15 mL, 15 mL, Oral, Q6H PRN, Hiram Fox II, MD  •  cloNIDine (CATAPRES) tablet 0.1 mg, 0.1 mg, Oral, " Q4H PRN, Hiram Fox II, MD  •  escitalopram (LEXAPRO) tablet 5 mg, 5 mg, Oral, Daily, Mirta Roman APRN, 5 mg at 03/26/20 0842  •  hydrOXYzine pamoate (VISTARIL) capsule 50 mg, 50 mg, Oral, Q6H PRN, Hiram Fox II, MD  •  loperamide (IMODIUM) capsule 2 mg, 2 mg, Oral, Q2H PRN, Hiram Fox II, MD  •  magnesium hydroxide (MILK OF MAGNESIA) suspension 2400 mg/10mL 10 mL, 10 mL, Oral, Daily PRN, Hiram Fox II, MD  •  nicotine (NICODERM CQ) 21 MG/24HR patch 1 patch, 1 patch, Transdermal, Q24H, Hiram Fox II, MD  •  nicotine polacrilex (NICORETTE) gum 2 mg, 2 mg, Mouth/Throat, Q2H PRN, Hiram Fox II, MD, 2 mg at 03/26/20 0842  •  topiramate (TOPAMAX) tablet 25 mg, 25 mg, Oral, Nightly, Hiram Fox II, MD, 25 mg at 03/25/20 2048  •  traZODone (DESYREL) tablet 50 mg, 50 mg, Oral, Nightly PRN, Hiram Fox II, MD  •  vitamin B-12 (CYANOCOBALAMIN) tablet 1,000 mcg, 1,000 mcg, Oral, Daily, Hiram Fox II, MD, 1,000 mcg at 03/26/20 0842    Diagnoses/Assessment:     Suicidal ideation    MDD (major depressive disorder), recurrent severe, without psychosis (CMS/HCC)    PTSD (post-traumatic stress disorder)    Borderline intellectual functioning    Personality traits affecting medical condition, B Cluster    Impulse control disorder    Ineffective coping    Nicotine use disorder      Treatment Plan:    1) Will continue care for the patient on the behavioral health unit at [facility] to ensure patient safety.  2) Will continue to provide treatment with the unit milieu, activities, therapies and psychopharmacological management.  3) Patient to be placed on or continued on  Q15 minute checks  and Suicide precautions.  4) Pertinent medical issues: none  5) Will order following labs: none  6) Will make the following medication changes:   --Cont Lexapro 5 mg daily   --Cont Topamax 25 mg nightly   7) Will continue discharge  planning as appropriate for patient.  8) Psychotherapy provided for less than 16 minutes.    Treatment plan and medication risks and benefits discussed with: Patient    KE Hinton  03/26/20  11:26

## 2020-03-26 NOTE — PLAN OF CARE
Problem: Patient Care Overview  Goal: Plan of Care Review  Outcome: Ongoing (interventions implemented as appropriate)  Flowsheets  Taken 3/25/2020 1753 by Joycelyn Rhodes, RN  Progress: improving  Taken 3/26/2020 0319 by Janette Ybarra RN  Plan of Care Reviewed With: patient  Patient Agreement with Plan of Care: agrees  Outcome Summary: Patient is cooperative and compliant with medications. Patient denies S/I, H/I and AVH.  Patient appears to have slept all night.

## 2020-03-26 NOTE — PLAN OF CARE
Problem: Patient Care Overview  Goal: Plan of Care Review  Outcome: Ongoing (interventions implemented as appropriate)  Flowsheets (Taken 3/26/2020 1300)  Progress: improving  Plan of Care Reviewed With: patient  Patient Agreement with Plan of Care: agrees  Outcome Summary: pt states feeling better toay, affect is bright, pt is talkative. Denies SI.

## 2020-03-26 NOTE — NURSING NOTE
Behavior   Note any precipitants to event or behavior   Describe level and action of any aggressive behavior or speech and associated interventions.     Anxiety: Patient denies at this time  Depression: Patient denies at this time  Pain  0  AVH   no  S/I   no  Plan  no  H/I   no  Plan  no    Affect   euthymic/normal      Note:  Pt's affect very bright today, pt is talkative.  Pt seeks to help & support peers.  States feeling much better but wants to continue to seek help.        Intervention    PRN medication utilized:  no    Instructed in medication usage and effects  Medications administered as ordered  Encouraged to verbalize needs      Response    Verbalized understanding   Did patient take medications as ordered yes   Did patient interact with assessment?  yes     Plan    Will monitor for safety  Will monitor every 15 minutes as ordered  Will evaluate and promote the plan of care    Last BM:  03/25/2020  (Please chart in I/O as well)

## 2020-03-26 NOTE — NURSING NOTE
Behavior   Note any precipitants to event or behavior   Describe level and action of any aggressive behavior or speech and associated interventions.     Anxiety: Patient denies at this time  Depression: Patient denies at this time  Pain  0  AVH   no  S/I   no  Plan  no  H/I   no  Plan  no    Affect   euthymic/normal      Note:Patient sitting in dining area interacting appropriately with staff and peers. Patient is cooperative and compliant with medications. Patient denies S/I, H/I and AVH.        Intervention    PRN medication utilized:  no    Instructed in medication usage and effects  Medications administered as ordered  Encouraged to verbalize needs      Response    Verbalized understanding   Did patient take medications as ordered yes   Did patient interact with assessment?  yes     Plan    Will monitor for safety  Will monitor every 15 minutes as ordered  Will evaluate and promote the plan of care    Last BM:    (Please chart in I/O as well)

## 2020-03-27 VITALS
DIASTOLIC BLOOD PRESSURE: 55 MMHG | TEMPERATURE: 97.4 F | BODY MASS INDEX: 25.71 KG/M2 | HEIGHT: 66 IN | RESPIRATION RATE: 18 BRPM | HEART RATE: 84 BPM | WEIGHT: 160 LBS | SYSTOLIC BLOOD PRESSURE: 108 MMHG | OXYGEN SATURATION: 98 %

## 2020-03-27 PROBLEM — R45.851 SUICIDAL IDEATION: Status: RESOLVED | Noted: 2020-03-24 | Resolved: 2020-03-27

## 2020-03-27 PROCEDURE — 99239 HOSP IP/OBS DSCHRG MGMT >30: CPT | Performed by: PSYCHIATRY & NEUROLOGY

## 2020-03-27 PROCEDURE — 90833 PSYTX W PT W E/M 30 MIN: CPT | Performed by: PSYCHIATRY & NEUROLOGY

## 2020-03-27 RX ORDER — TOPIRAMATE 25 MG/1
25 TABLET ORAL NIGHTLY
Qty: 30 TABLET | Refills: 1 | Status: SHIPPED | OUTPATIENT
Start: 2020-03-27 | End: 2020-08-27

## 2020-03-27 RX ORDER — ESCITALOPRAM OXALATE 5 MG/1
5 TABLET ORAL DAILY
Qty: 30 TABLET | Refills: 1 | Status: SHIPPED | OUTPATIENT
Start: 2020-03-28 | End: 2020-08-27

## 2020-03-27 RX ORDER — NICOTINE 21 MG/24HR
1 PATCH, TRANSDERMAL 24 HOURS TRANSDERMAL EVERY 24 HOURS
Qty: 28 EACH | Refills: 1 | Status: SHIPPED | OUTPATIENT
Start: 2020-03-28 | End: 2020-08-27

## 2020-03-27 RX ADMIN — CYANOCOBALAMIN TAB 1000 MCG 1000 MCG: 1000 TAB at 08:05

## 2020-03-27 RX ADMIN — ESCITALOPRAM 5 MG: 5 TABLET, FILM COATED ORAL at 08:05

## 2020-03-27 NOTE — NURSING NOTE
Behavior   Note any precipitants to event or behavior   Describe level and action of any aggressive behavior or speech and associated interventions.     Anxiety: Patient denies at this time  Depression: depressed mood  Pain  0  AVH   no  S/I   no  Plan  no  H/I   no  Plan  no    Affect   euthymic/normal      Note: Pt denies SI/HI/AVH. Pt appears to be resting well. Pt ask this nurse for meth on multiple occasions this shift. This nurse explained to pt that this was inappropriate and he didn't need to ask that anymore. Interacts with peers and staff. Takes medications as ordered. Will continue to monitor.      Intervention    PRN medication utilized:  no    Instructed in medication usage and effects  Medications administered as ordered  Encouraged to verbalize needs      Response    Verbalized understanding   Did patient take medications as ordered yes   Did patient interact with assessment?  yes     Plan    Will monitor for safety  Will monitor every 15 minutes as ordered  Will evaluate and promote the plan of care    Last BM:  unknown date  (Please chart in I/O as well)
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
## 2020-03-27 NOTE — DISCHARGE INSTRUCTIONS
--Continue medications as currently prescribed.  --Continue follow-up with outpatient providers.  --Please contact our Behavioral Health Unit with any questions or concerns at [phone].  --Return to the ER with any suicidal or homicidal ideation, or worsening symptoms.    --The number for the National Suicide & Crisis Hotline is 1-146.772.9243.  The National Crisis Text number is 435719.  These may be contacted at any time, if needed.

## 2020-03-27 NOTE — NURSING NOTE
Pt discharged per Dr's orders. AVS reviewed with pt and copy given. Pt was alert, oriented x3 verbal and ambulatory upon leaving. He had all personal items upon discharge.

## 2020-03-27 NOTE — SIGNIFICANT NOTE
LCSW met with pt 1:1 and reviewed safety/dc plan. Pt presented to the day room, casually dressed, alert and oriented x3. Mood is fair, affect is neutral. Pt is pleasant and cooperative. Pt denies SI/HI, AVH. Pt states that his plan is to go stay with his cousin tonight and his mother is going to take him to [place] tomorrow. Pt plans to follow up with [facility] in [place]. Pt did participate in individual and group tx while on the unit. Pt does appear to be more hopeful and motivated. Pt does not appear to be an imminent risk to self or others, appears to be nearing baseline. Pt denies access to firearms. BPRS completed upon dc.

## 2020-03-27 NOTE — PLAN OF CARE
Problem: Patient Care Overview  Goal: Plan of Care Review  Outcome: Ongoing (interventions implemented as appropriate)  Flowsheets (Taken 3/27/2020 5291)  Progress: improving  Plan of Care Reviewed With: patient  Patient Agreement with Plan of Care: agrees  Outcome Summary: Pt denies SI/HI/AVH. Pt appears to be resting well. Pt ask this nurse for meth on multiple occasions this shift. This nurse explained to pt that this was inappropriate and he didn't need to ask that anymore. Interacts with peers and staff. Takes medications as ordered. Will continue to monitor.

## 2020-03-27 NOTE — NURSING NOTE
Behavior   Note any precipitants to event or behavior   Describe level and action of any aggressive behavior or speech and associated interventions.     Anxiety: Patient denies at this time  Depression: Patient denies at this time  Pain  0  AVH   no  S/I   no  Plan  no  H/I   no  Plan  no    Affect   euthymic/normal      Note:Pt is alert, oriented x3 verbal and ambulatory. Appropriate interaction with staff and peers. Pt reports he is leaving today and being taken to rehab tomorrow. He is very excited about this change in his life. Pt took medication as ordered.       Intervention    PRN medication utilized:  no    Instructed in medication usage and effects  Medications administered as ordered  Encouraged to verbalize needs      Response    Verbalized understanding   Did patient take medications as ordered yes   Did patient interact with assessment?  yes     Plan    Will monitor for safety  Will monitor every 15 minutes as ordered  Will evaluate and promote the plan of care    Last BM:  unknown date  (Please chart in I/O as well)

## 2022-01-16 ENCOUNTER — HOSPITAL ENCOUNTER (EMERGENCY)
Facility: HOSPITAL | Age: 27
Discharge: LEFT BEFORE DISCHARGE | End: 2022-01-16

## 2022-01-16 VITALS — OXYGEN SATURATION: 97 % | RESPIRATION RATE: 18 BRPM | HEART RATE: 96 BPM | TEMPERATURE: 97.8 F

## 2022-01-16 PROCEDURE — 99211 OFF/OP EST MAY X REQ PHY/QHP: CPT

## 2022-09-09 NOTE — ED NOTES
On hold to give nursing report to [facility].     Erica Granado, RN  10/04/19 0661     No

## 2023-06-14 ENCOUNTER — HOSPITAL ENCOUNTER (EMERGENCY)
Facility: HOSPITAL | Age: 28
Discharge: PSYCHIATRIC OR SUBSTANCE USE FACILITY | End: 2023-06-15
Attending: EMERGENCY MEDICINE
Payer: COMMERCIAL

## 2023-06-14 VITALS
HEART RATE: 94 BPM | BODY MASS INDEX: 28.32 KG/M2 | SYSTOLIC BLOOD PRESSURE: 126 MMHG | RESPIRATION RATE: 18 BRPM | HEIGHT: 65 IN | DIASTOLIC BLOOD PRESSURE: 87 MMHG | WEIGHT: 170 LBS | TEMPERATURE: 98.2 F | OXYGEN SATURATION: 95 %

## 2023-06-14 DIAGNOSIS — R45.851 SUICIDAL IDEATION: Primary | ICD-10-CM

## 2023-06-14 LAB
ALBUMIN SERPL-MCNC: 4.4 G/DL (ref 3.5–5.2)
ALBUMIN/GLOB SERPL: 1.4 G/DL
ALP SERPL-CCNC: 128 U/L (ref 39–117)
ALT SERPL W P-5'-P-CCNC: 67 U/L (ref 1–41)
AMPHET+METHAMPHET UR QL: NEGATIVE
AMPHETAMINES UR QL: NEGATIVE
ANION GAP SERPL CALCULATED.3IONS-SCNC: 13 MMOL/L (ref 5–15)
APAP SERPL-MCNC: <5 MCG/ML (ref 0–30)
AST SERPL-CCNC: 24 U/L (ref 1–40)
BARBITURATES UR QL SCN: NEGATIVE
BASOPHILS # BLD AUTO: 0.12 10*3/MM3 (ref 0–0.2)
BASOPHILS NFR BLD AUTO: 0.9 % (ref 0–1.5)
BENZODIAZ UR QL SCN: NEGATIVE
BILIRUB SERPL-MCNC: 1 MG/DL (ref 0–1.2)
BILIRUB UR QL STRIP: ABNORMAL
BUN SERPL-MCNC: 11 MG/DL (ref 6–20)
BUN/CREAT SERPL: 14.1 (ref 7–25)
BUPRENORPHINE SERPL-MCNC: NEGATIVE NG/ML
CALCIUM SPEC-SCNC: 9.7 MG/DL (ref 8.6–10.5)
CANNABINOIDS SERPL QL: NEGATIVE
CHLORIDE SERPL-SCNC: 105 MMOL/L (ref 98–107)
CLARITY UR: CLEAR
CO2 SERPL-SCNC: 22 MMOL/L (ref 22–29)
COCAINE UR QL: NEGATIVE
COLOR UR: YELLOW
CREAT SERPL-MCNC: 0.78 MG/DL (ref 0.76–1.27)
DEPRECATED RDW RBC AUTO: 37.5 FL (ref 37–54)
EGFRCR SERPLBLD CKD-EPI 2021: 126.1 ML/MIN/1.73
EOSINOPHIL # BLD AUTO: 0.21 10*3/MM3 (ref 0–0.4)
EOSINOPHIL NFR BLD AUTO: 1.6 % (ref 0.3–6.2)
ERYTHROCYTE [DISTWIDTH] IN BLOOD BY AUTOMATED COUNT: 12.4 % (ref 12.3–15.4)
ETHANOL BLD-MCNC: <10 MG/DL (ref 0–10)
ETHANOL UR QL: <0.01 %
FENTANYL UR-MCNC: NEGATIVE NG/ML
FLUAV SUBTYP SPEC NAA+PROBE: NOT DETECTED
FLUBV RNA ISLT QL NAA+PROBE: NOT DETECTED
GLOBULIN UR ELPH-MCNC: 3.2 GM/DL
GLUCOSE SERPL-MCNC: 95 MG/DL (ref 65–99)
GLUCOSE UR STRIP-MCNC: NEGATIVE MG/DL
HCT VFR BLD AUTO: 46.9 % (ref 37.5–51)
HGB BLD-MCNC: 15.8 G/DL (ref 13–17.7)
HGB UR QL STRIP.AUTO: ABNORMAL
HOLD SPECIMEN: NORMAL
HOLD SPECIMEN: NORMAL
IMM GRANULOCYTES # BLD AUTO: 0.03 10*3/MM3 (ref 0–0.05)
IMM GRANULOCYTES NFR BLD AUTO: 0.2 % (ref 0–0.5)
KETONES UR QL STRIP: ABNORMAL
LEUKOCYTE ESTERASE UR QL STRIP.AUTO: ABNORMAL
LYMPHOCYTES # BLD AUTO: 4.82 10*3/MM3 (ref 0.7–3.1)
LYMPHOCYTES NFR BLD AUTO: 35.8 % (ref 19.6–45.3)
MCH RBC QN AUTO: 28.1 PG (ref 26.6–33)
MCHC RBC AUTO-ENTMCNC: 33.7 G/DL (ref 31.5–35.7)
MCV RBC AUTO: 83.5 FL (ref 79–97)
METHADONE UR QL SCN: NEGATIVE
MONOCYTES # BLD AUTO: 0.93 10*3/MM3 (ref 0.1–0.9)
MONOCYTES NFR BLD AUTO: 6.9 % (ref 5–12)
NEUTROPHILS NFR BLD AUTO: 54.6 % (ref 42.7–76)
NEUTROPHILS NFR BLD AUTO: 7.35 10*3/MM3 (ref 1.7–7)
NITRITE UR QL STRIP: NEGATIVE
NRBC BLD AUTO-RTO: 0 /100 WBC (ref 0–0.2)
OPIATES UR QL: NEGATIVE
OXYCODONE UR QL SCN: NEGATIVE
PCP UR QL SCN: NEGATIVE
PH UR STRIP.AUTO: 6 [PH] (ref 5–9)
PLATELET # BLD AUTO: 219 10*3/MM3 (ref 140–450)
PMV BLD AUTO: 13.6 FL (ref 6–12)
POTASSIUM SERPL-SCNC: 3.9 MMOL/L (ref 3.5–5.2)
PROPOXYPH UR QL: NEGATIVE
PROT SERPL-MCNC: 7.6 G/DL (ref 6–8.5)
PROT UR QL STRIP: ABNORMAL
RBC # BLD AUTO: 5.62 10*6/MM3 (ref 4.14–5.8)
SALICYLATES SERPL-MCNC: 0.4 MG/DL
SARS-COV-2 RNA RESP QL NAA+PROBE: NOT DETECTED
SODIUM SERPL-SCNC: 140 MMOL/L (ref 136–145)
SP GR UR STRIP: 1.02 (ref 1–1.03)
TRICYCLICS UR QL SCN: NEGATIVE
UROBILINOGEN UR QL STRIP: ABNORMAL
WBC NRBC COR # BLD: 13.46 10*3/MM3 (ref 3.4–10.8)
WHOLE BLOOD HOLD COAG: NORMAL
WHOLE BLOOD HOLD SPECIMEN: NORMAL

## 2023-06-14 PROCEDURE — 80307 DRUG TEST PRSMV CHEM ANLYZR: CPT | Performed by: EMERGENCY MEDICINE

## 2023-06-14 PROCEDURE — 80179 DRUG ASSAY SALICYLATE: CPT | Performed by: EMERGENCY MEDICINE

## 2023-06-14 PROCEDURE — 80143 DRUG ASSAY ACETAMINOPHEN: CPT | Performed by: EMERGENCY MEDICINE

## 2023-06-14 PROCEDURE — 85025 COMPLETE CBC W/AUTO DIFF WBC: CPT | Performed by: EMERGENCY MEDICINE

## 2023-06-14 PROCEDURE — 87636 SARSCOV2 & INF A&B AMP PRB: CPT | Performed by: EMERGENCY MEDICINE

## 2023-06-14 PROCEDURE — 80053 COMPREHEN METABOLIC PANEL: CPT | Performed by: EMERGENCY MEDICINE

## 2023-06-14 PROCEDURE — 82077 ASSAY SPEC XCP UR&BREATH IA: CPT | Performed by: EMERGENCY MEDICINE

## 2023-06-14 PROCEDURE — 81001 URINALYSIS AUTO W/SCOPE: CPT | Performed by: EMERGENCY MEDICINE

## 2023-06-15 PROBLEM — R45.851 SUICIDAL IDEATION: Status: ACTIVE | Noted: 2023-06-15

## 2023-06-15 LAB
BACTERIA UR QL AUTO: ABNORMAL /HPF
COD CRY URNS QL: ABNORMAL /HPF
HYALINE CASTS UR QL AUTO: ABNORMAL /LPF
RBC # UR STRIP: ABNORMAL /HPF
REF LAB TEST METHOD: ABNORMAL
SQUAMOUS #/AREA URNS HPF: ABNORMAL /HPF
WBC # UR STRIP: ABNORMAL /HPF

## 2023-06-15 NOTE — ED NOTES
This tech is sitting 1:1 for continuous patient monitoring. Patient has been placed in a yellow gown with yellow non skid socks on. Patient belongings bagged and have been picked up by security. Patient has been provided with a warm blanket , all needs met at this time.

## 2023-06-15 NOTE — ED PROVIDER NOTES
Subjective   History of Present Illness  This is a 27-year-old male patient who presents for psychiatric evaluation.  The patient states he has suicidal ideation and made a suicide attempt by attempting to cut his left wrist with a sharp object.  He claims past psychiatric issues and admissions.  He denies any recent fevers or chills.  Denies chest pain or abdominal pain.  Denies injury.  Denies recent medication changes.      Review of Systems   All other systems reviewed and are negative.    Past Medical History:   Diagnosis Date    ADHD (attention deficit hyperactivity disorder)     Anxiety     Depression     PTSD (post-traumatic stress disorder)        Allergies   Allergen Reactions    Adderall [Amphetamine-Dextroamphetamine] Other (See Comments)     Pt states its redness all over     Amoxicillin Unknown - Low Severity    Flexeril [Cyclobenzaprine] Hives    Penicillins Other (See Comments)     Pt doesn't know the exact side affect     Toradol [Ketorolac Tromethamine] Confusion     PT FELT HIGH,     Benadryl [Diphenhydramine Hcl (Sleep)] Rash       Past Surgical History:   Procedure Laterality Date    DENTAL PROCEDURE         Family History   Problem Relation Age of Onset    Diabetes Mother     Alcohol abuse Mother     No Known Problems Father     Mental illness Paternal Uncle     Alcohol abuse Paternal Uncle     Alcohol abuse Paternal Uncle     Suicide Attempts Neg Hx        Social History     Socioeconomic History    Marital status: Single   Tobacco Use    Smoking status: Every Day     Packs/day: 1.00     Types: Cigarettes    Smokeless tobacco: Former   Substance and Sexual Activity    Alcohol use: No    Drug use: No    Sexual activity: Defer           Objective   Physical Exam  Vitals and nursing note reviewed.   Constitutional:       Appearance: He is not ill-appearing.   HENT:      Head: Normocephalic and atraumatic.      Nose: Nose normal.   Eyes:      General: No scleral icterus.  Cardiovascular:      Rate  and Rhythm: Normal rate.   Pulmonary:      Effort: Pulmonary effort is normal.   Abdominal:      General: Abdomen is flat.   Musculoskeletal:         General: No signs of injury.   Skin:     General: Skin is dry.      Comments: No visible break in the skin of the wrists   Neurological:      Mental Status: He is alert and oriented to person, place, and time.   Psychiatric:      Comments: Depressed mood       Procedures           ED Course  ED Course as of 06/15/23 0020   Wed Jun 14, 2023 2308 Urinalysis With Microscopic If Indicated (No Culture) - Urine, Clean Catch(!)  No elevation of serum bilirubin. [JV]      ED Course User Index  [JV] Santy Suero DO                                               Final diagnoses:   Suicidal ideation       ED Disposition  ED Disposition       ED Disposition   DC/Transfer to Behavioral Health    Condition   --    Comment   --               No follow-up provider specified.       Medication List      No changes were made to your prescriptions during this visit.            Santy Suero DO  06/14/23 2309       Santy Suero DO  06/15/23 0021
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
## 2023-06-17 PROBLEM — T14.91XA SUICIDAL BEHAVIOR WITH ATTEMPTED SELF-INJURY: Status: ACTIVE | Noted: 2023-06-17

## 2023-06-19 PROBLEM — R45.851 SUICIDAL IDEATION: Status: RESOLVED | Noted: 2023-06-15 | Resolved: 2023-06-19

## 2024-08-06 ENCOUNTER — HOSPITAL ENCOUNTER (INPATIENT)
Age: 29
LOS: 2 days | Discharge: HOME | End: 2024-08-09
Attending: EMERGENCY MEDICINE | Admitting: PSYCHIATRY & NEUROLOGY
Payer: MEDICAID

## 2024-08-06 ENCOUNTER — APPOINTMENT (OUTPATIENT)
Dept: CT IMAGING | Age: 29
End: 2024-08-06
Payer: MEDICAID

## 2024-08-06 DIAGNOSIS — R45.851 DEPRESSION WITH SUICIDAL IDEATION: Primary | ICD-10-CM

## 2024-08-06 DIAGNOSIS — N39.0 URINARY TRACT INFECTION WITHOUT HEMATURIA, SITE UNSPECIFIED: ICD-10-CM

## 2024-08-06 DIAGNOSIS — R10.84 GENERALIZED ABDOMINAL PAIN: ICD-10-CM

## 2024-08-06 DIAGNOSIS — F32.A DEPRESSION WITH SUICIDAL IDEATION: Primary | ICD-10-CM

## 2024-08-06 LAB
ALBUMIN SERPL-MCNC: 4.4 G/DL (ref 3.5–5.2)
ALP SERPL-CCNC: 120 U/L (ref 40–130)
ALT SERPL-CCNC: 48 U/L (ref 5–41)
AMPHET UR QL SCN: NEGATIVE
ANION GAP SERPL CALCULATED.3IONS-SCNC: 16 MMOL/L (ref 7–19)
AST SERPL-CCNC: 16 U/L (ref 5–40)
BACTERIA URNS QL MICRO: ABNORMAL /HPF
BARBITURATES UR QL SCN: NEGATIVE
BENZODIAZ UR QL SCN: NEGATIVE
BILIRUB SERPL-MCNC: 0.9 MG/DL (ref 0.2–1.2)
BILIRUB UR QL STRIP: NEGATIVE
BUN SERPL-MCNC: 14 MG/DL (ref 6–20)
BUPRENORPHINE URINE: NEGATIVE
CALCIUM SERPL-MCNC: 9 MG/DL (ref 8.6–10)
CANNABINOIDS UR QL SCN: NEGATIVE
CHLORIDE SERPL-SCNC: 104 MMOL/L (ref 98–111)
CLARITY UR: CLEAR
CO2 SERPL-SCNC: 21 MMOL/L (ref 22–29)
COCAINE UR QL SCN: NEGATIVE
COLOR UR: ABNORMAL
CREAT SERPL-MCNC: 1 MG/DL (ref 0.5–1.2)
CRYSTALS URNS MICRO: ABNORMAL /HPF
DRUG SCREEN COMMENT UR-IMP: NORMAL
EPI CELLS #/AREA URNS AUTO: 0 /HPF (ref 0–5)
ETHANOLAMINE SERPL-MCNC: <10 MG/DL (ref 0–0.08)
FENTANYL SCREEN, URINE: NEGATIVE
GLUCOSE SERPL-MCNC: 96 MG/DL (ref 74–109)
GLUCOSE UR STRIP.AUTO-MCNC: NEGATIVE MG/DL
HGB UR STRIP.AUTO-MCNC: ABNORMAL MG/L
HYALINE CASTS #/AREA URNS AUTO: 22 /HPF (ref 0–8)
KETONES UR STRIP.AUTO-MCNC: 15 MG/DL
LEUKOCYTE ESTERASE UR QL STRIP.AUTO: ABNORMAL
METHADONE UR QL SCN: NEGATIVE
METHAMPHETAMINE, URINE: NEGATIVE
NITRITE UR QL STRIP.AUTO: NEGATIVE
OPIATES UR QL SCN: NEGATIVE
OXYCODONE UR QL SCN: NEGATIVE
PCP UR QL SCN: NEGATIVE
PH UR STRIP.AUTO: 6 [PH] (ref 5–8)
POTASSIUM SERPL-SCNC: 3.8 MMOL/L (ref 3.5–5)
PROT SERPL-MCNC: 7.3 G/DL (ref 6.6–8.7)
PROT UR STRIP.AUTO-MCNC: 30 MG/DL
RBC #/AREA URNS AUTO: 13 /HPF (ref 0–4)
SARS-COV-2 RDRP RESP QL NAA+PROBE: NOT DETECTED
SODIUM SERPL-SCNC: 141 MMOL/L (ref 136–145)
SP GR UR STRIP.AUTO: 1.03 (ref 1–1.03)
TRICYCLIC ANTIDEPRESSANTS, UR: NEGATIVE
UROBILINOGEN UR STRIP.AUTO-MCNC: 1 E.U./DL
WBC #/AREA URNS AUTO: 50 /HPF (ref 0–5)

## 2024-08-06 PROCEDURE — 36415 COLL VENOUS BLD VENIPUNCTURE: CPT

## 2024-08-06 PROCEDURE — 81001 URINALYSIS AUTO W/SCOPE: CPT

## 2024-08-06 PROCEDURE — 80053 COMPREHEN METABOLIC PANEL: CPT

## 2024-08-06 PROCEDURE — 87086 URINE CULTURE/COLONY COUNT: CPT

## 2024-08-06 PROCEDURE — 80307 DRUG TEST PRSMV CHEM ANLYZR: CPT

## 2024-08-06 PROCEDURE — 82077 ASSAY SPEC XCP UR&BREATH IA: CPT

## 2024-08-06 PROCEDURE — 85025 COMPLETE CBC W/AUTO DIFF WBC: CPT

## 2024-08-06 PROCEDURE — 87635 SARS-COV-2 COVID-19 AMP PRB: CPT

## 2024-08-06 PROCEDURE — G0480 DRUG TEST DEF 1-7 CLASSES: HCPCS

## 2024-08-06 PROCEDURE — 99285 EMERGENCY DEPT VISIT HI MDM: CPT

## 2024-08-06 RX ORDER — LEVOFLOXACIN 5 MG/ML
750 INJECTION, SOLUTION INTRAVENOUS ONCE
Status: COMPLETED | OUTPATIENT
Start: 2024-08-06 | End: 2024-08-07

## 2024-08-06 RX ORDER — 0.9 % SODIUM CHLORIDE 0.9 %
1000 INTRAVENOUS SOLUTION INTRAVENOUS ONCE
Status: COMPLETED | OUTPATIENT
Start: 2024-08-06 | End: 2024-08-07

## 2024-08-06 ASSESSMENT — ENCOUNTER SYMPTOMS
EYE PAIN: 0
VOMITING: 0
ABDOMINAL PAIN: 1
DIARRHEA: 0
SHORTNESS OF BREATH: 0

## 2024-08-07 ENCOUNTER — APPOINTMENT (OUTPATIENT)
Dept: CT IMAGING | Age: 29
End: 2024-08-07
Payer: MEDICAID

## 2024-08-07 PROBLEM — R45.851 DEPRESSION WITH SUICIDAL IDEATION: Status: ACTIVE | Noted: 2024-08-07

## 2024-08-07 PROBLEM — F32.A DEPRESSION WITH SUICIDAL IDEATION: Status: ACTIVE | Noted: 2024-08-07

## 2024-08-07 PROBLEM — F39 UNSPECIFIED MOOD (AFFECTIVE) DISORDER (HCC): Status: ACTIVE | Noted: 2024-08-07

## 2024-08-07 LAB
B PARAP IS1001 DNA NPH QL NAA+NON-PROBE: NOT DETECTED
B PERT.PT PRMT NPH QL NAA+NON-PROBE: NOT DETECTED
BASOPHILS # BLD: 0.1 K/UL (ref 0–0.2)
BASOPHILS NFR BLD: 1 % (ref 0–1)
C PNEUM DNA NPH QL NAA+NON-PROBE: NOT DETECTED
EOSINOPHIL # BLD: 0.29 K/UL (ref 0–0.6)
EOSINOPHIL NFR BLD: 2 % (ref 0–5)
ERYTHROCYTE [DISTWIDTH] IN BLOOD BY AUTOMATED COUNT: 12.6 % (ref 11.5–14.5)
FLUAV RNA NPH QL NAA+NON-PROBE: NOT DETECTED
FLUBV RNA NPH QL NAA+NON-PROBE: NOT DETECTED
HADV DNA NPH QL NAA+NON-PROBE: NOT DETECTED
HCOV 229E RNA NPH QL NAA+NON-PROBE: NOT DETECTED
HCOV HKU1 RNA NPH QL NAA+NON-PROBE: NOT DETECTED
HCOV NL63 RNA NPH QL NAA+NON-PROBE: NOT DETECTED
HCOV OC43 RNA NPH QL NAA+NON-PROBE: NOT DETECTED
HCT VFR BLD AUTO: 47.7 % (ref 42–52)
HGB BLD-MCNC: 16.2 G/DL (ref 14–18)
HMPV RNA NPH QL NAA+NON-PROBE: NOT DETECTED
HPIV1 RNA NPH QL NAA+NON-PROBE: NOT DETECTED
HPIV2 RNA NPH QL NAA+NON-PROBE: NOT DETECTED
HPIV3 RNA NPH QL NAA+NON-PROBE: NOT DETECTED
HPIV4 RNA NPH QL NAA+NON-PROBE: NOT DETECTED
IMM GRANULOCYTES # BLD: 0.1 K/UL
LACTATE BLDV-SCNC: 1.5 MMOL/L (ref 0.5–1.9)
LYMPHOCYTES # BLD: 5.2 K/UL (ref 1.1–4.5)
LYMPHOCYTES NFR BLD: 33 % (ref 20–40)
M PNEUMO DNA NPH QL NAA+NON-PROBE: NOT DETECTED
MCH RBC QN AUTO: 28.9 PG (ref 27–31)
MCHC RBC AUTO-ENTMCNC: 34 G/DL (ref 33–37)
MCV RBC AUTO: 85 FL (ref 80–94)
MONOCYTES # BLD: 0.9 K/UL (ref 0–0.9)
MONOCYTES NFR BLD: 6 % (ref 0–10)
NEUTROPHILS # BLD: 8 K/UL (ref 1.5–7.5)
NEUTS SEG NFR BLD: 55 % (ref 50–65)
PLATELET # BLD AUTO: 231 K/UL (ref 130–400)
PLATELET SLIDE REVIEW: ADEQUATE
PMV BLD AUTO: 12.5 FL (ref 9.4–12.4)
RBC # BLD AUTO: 5.61 M/UL (ref 4.7–6.1)
RBC MORPH BLD: NORMAL
RSV RNA NPH QL NAA+NON-PROBE: NOT DETECTED
RV+EV RNA NPH QL NAA+NON-PROBE: NOT DETECTED
SARS-COV-2 RNA NPH QL NAA+NON-PROBE: NOT DETECTED
VARIANT LYMPHS NFR BLD: 3 % (ref 0–8)
WBC # BLD AUTO: 14.5 K/UL (ref 4.8–10.8)

## 2024-08-07 PROCEDURE — 96365 THER/PROPH/DIAG IV INF INIT: CPT

## 2024-08-07 PROCEDURE — 6370000000 HC RX 637 (ALT 250 FOR IP): Performed by: PSYCHIATRY & NEUROLOGY

## 2024-08-07 PROCEDURE — 99999 PR OFFICE/OUTPT VISIT,PROCEDURE ONLY: CPT | Performed by: PSYCHIATRY & NEUROLOGY

## 2024-08-07 PROCEDURE — 74177 CT ABD & PELVIS W/CONTRAST: CPT

## 2024-08-07 PROCEDURE — 6360000002 HC RX W HCPCS: Performed by: EMERGENCY MEDICINE

## 2024-08-07 PROCEDURE — 83605 ASSAY OF LACTIC ACID: CPT

## 2024-08-07 PROCEDURE — 0202U NFCT DS 22 TRGT SARS-COV-2: CPT

## 2024-08-07 PROCEDURE — 2580000003 HC RX 258: Performed by: EMERGENCY MEDICINE

## 2024-08-07 PROCEDURE — 96366 THER/PROPH/DIAG IV INF ADDON: CPT

## 2024-08-07 PROCEDURE — 87040 BLOOD CULTURE FOR BACTERIA: CPT

## 2024-08-07 PROCEDURE — 6360000004 HC RX CONTRAST MEDICATION: Performed by: EMERGENCY MEDICINE

## 2024-08-07 PROCEDURE — 1240000000 HC EMOTIONAL WELLNESS R&B

## 2024-08-07 PROCEDURE — 36415 COLL VENOUS BLD VENIPUNCTURE: CPT

## 2024-08-07 PROCEDURE — 99223 1ST HOSP IP/OBS HIGH 75: CPT | Performed by: PSYCHIATRY & NEUROLOGY

## 2024-08-07 RX ORDER — ACETAMINOPHEN 325 MG/1
650 TABLET ORAL EVERY 4 HOURS PRN
Status: DISCONTINUED | OUTPATIENT
Start: 2024-08-07 | End: 2024-08-09 | Stop reason: HOSPADM

## 2024-08-07 RX ORDER — POLYETHYLENE GLYCOL 3350 17 G
2 POWDER IN PACKET (EA) ORAL
Status: DISCONTINUED | OUTPATIENT
Start: 2024-08-07 | End: 2024-08-09 | Stop reason: HOSPADM

## 2024-08-07 RX ORDER — QUETIAPINE FUMARATE 50 MG/1
50 TABLET, FILM COATED ORAL NIGHTLY
Status: DISCONTINUED | OUTPATIENT
Start: 2024-08-07 | End: 2024-08-09 | Stop reason: HOSPADM

## 2024-08-07 RX ORDER — MECOBALAMIN 5000 MCG
5 TABLET,DISINTEGRATING ORAL NIGHTLY PRN
Status: DISCONTINUED | OUTPATIENT
Start: 2024-08-07 | End: 2024-08-09 | Stop reason: HOSPADM

## 2024-08-07 RX ORDER — VENLAFAXINE HYDROCHLORIDE 75 MG/1
75 CAPSULE, EXTENDED RELEASE ORAL
Status: DISCONTINUED | OUTPATIENT
Start: 2024-08-08 | End: 2024-08-09 | Stop reason: HOSPADM

## 2024-08-07 RX ORDER — TRAZODONE HYDROCHLORIDE 50 MG/1
50 TABLET ORAL NIGHTLY PRN
Status: DISCONTINUED | OUTPATIENT
Start: 2024-08-07 | End: 2024-08-09 | Stop reason: HOSPADM

## 2024-08-07 RX ORDER — NICOTINE 21 MG/24HR
1 PATCH, TRANSDERMAL 24 HOURS TRANSDERMAL DAILY
Status: DISCONTINUED | OUTPATIENT
Start: 2024-08-07 | End: 2024-08-07

## 2024-08-07 RX ORDER — POLYETHYLENE GLYCOL 3350 17 G/17G
17 POWDER, FOR SOLUTION ORAL DAILY PRN
Status: DISCONTINUED | OUTPATIENT
Start: 2024-08-07 | End: 2024-08-09 | Stop reason: HOSPADM

## 2024-08-07 RX ORDER — HYDROXYZINE HYDROCHLORIDE 25 MG/1
25 TABLET, FILM COATED ORAL 3 TIMES DAILY PRN
Status: DISCONTINUED | OUTPATIENT
Start: 2024-08-07 | End: 2024-08-09 | Stop reason: HOSPADM

## 2024-08-07 RX ADMIN — NICOTINE POLACRILEX 2 MG: 2 LOZENGE ORAL at 20:49

## 2024-08-07 RX ADMIN — NICOTINE POLACRILEX 2 MG: 2 LOZENGE ORAL at 19:18

## 2024-08-07 RX ADMIN — NICOTINE POLACRILEX 2 MG: 2 LOZENGE ORAL at 16:54

## 2024-08-07 RX ADMIN — LEVOFLOXACIN 750 MG: 5 INJECTION, SOLUTION INTRAVENOUS at 00:38

## 2024-08-07 RX ADMIN — IOPAMIDOL 70 ML: 755 INJECTION, SOLUTION INTRAVENOUS at 00:15

## 2024-08-07 RX ADMIN — SODIUM CHLORIDE 1000 ML: 9 INJECTION, SOLUTION INTRAVENOUS at 00:36

## 2024-08-07 RX ADMIN — NICOTINE POLACRILEX 2 MG: 2 LOZENGE ORAL at 13:09

## 2024-08-07 RX ADMIN — QUETIAPINE FUMARATE 50 MG: 50 TABLET ORAL at 21:11

## 2024-08-07 SDOH — ECONOMIC STABILITY: INCOME INSECURITY
IN THE PAST 12 MONTHS, HAS THE ELECTRIC, GAS, OIL, OR WATER COMPANY THREATENED TO SHUT OFF SERVICE IN YOUR HOME?: ALREADY SHUT OFF

## 2024-08-07 SDOH — ECONOMIC STABILITY: FOOD INSECURITY: WITHIN THE PAST 12 MONTHS, YOU WORRIED THAT YOUR FOOD WOULD RUN OUT BEFORE YOU GOT MONEY TO BUY MORE.: OFTEN TRUE

## 2024-08-07 SDOH — ECONOMIC STABILITY: INCOME INSECURITY: HOW HARD IS IT FOR YOU TO PAY FOR THE VERY BASICS LIKE FOOD, HOUSING, MEDICAL CARE, AND HEATING?: VERY HARD

## 2024-08-07 ASSESSMENT — PATIENT HEALTH QUESTIONNAIRE - PHQ9
10. IF YOU CHECKED OFF ANY PROBLEMS, HOW DIFFICULT HAVE THESE PROBLEMS MADE IT FOR YOU TO DO YOUR WORK, TAKE CARE OF THINGS AT HOME, OR GET ALONG WITH OTHER PEOPLE: EXTREMELY DIFFICULT
1. LITTLE INTEREST OR PLEASURE IN DOING THINGS: MORE THAN HALF THE DAYS
SUM OF ALL RESPONSES TO PHQ QUESTIONS 1-9: 25
8. MOVING OR SPEAKING SO SLOWLY THAT OTHER PEOPLE COULD HAVE NOTICED. OR THE OPPOSITE, BEING SO FIGETY OR RESTLESS THAT YOU HAVE BEEN MOVING AROUND A LOT MORE THAN USUAL: SEVERAL DAYS
6. FEELING BAD ABOUT YOURSELF - OR THAT YOU ARE A FAILURE OR HAVE LET YOURSELF OR YOUR FAMILY DOWN: NEARLY EVERY DAY
9. THOUGHTS THAT YOU WOULD BE BETTER OFF DEAD, OR OF HURTING YOURSELF: NEARLY EVERY DAY
4. FEELING TIRED OR HAVING LITTLE ENERGY: NEARLY EVERY DAY
SUM OF ALL RESPONSES TO PHQ QUESTIONS 1-9: 4
SUM OF ALL RESPONSES TO PHQ QUESTIONS 1-9: 22
SUM OF ALL RESPONSES TO PHQ QUESTIONS 1-9: 25
5. POOR APPETITE OR OVEREATING: NEARLY EVERY DAY
7. TROUBLE CONCENTRATING ON THINGS, SUCH AS READING THE NEWSPAPER OR WATCHING TELEVISION: NEARLY EVERY DAY
SUM OF ALL RESPONSES TO PHQ QUESTIONS 1-9: 4
1. LITTLE INTEREST OR PLEASURE IN DOING THINGS: NEARLY EVERY DAY
SUM OF ALL RESPONSES TO PHQ9 QUESTIONS 1 & 2: 6
2. FEELING DOWN, DEPRESSED OR HOPELESS: MORE THAN HALF THE DAYS
SUM OF ALL RESPONSES TO PHQ QUESTIONS 1-9: 4
3. TROUBLE FALLING OR STAYING ASLEEP: NEARLY EVERY DAY
SUM OF ALL RESPONSES TO PHQ9 QUESTIONS 1 & 2: 4
SUM OF ALL RESPONSES TO PHQ QUESTIONS 1-9: 25
2. FEELING DOWN, DEPRESSED OR HOPELESS: NEARLY EVERY DAY
SUM OF ALL RESPONSES TO PHQ QUESTIONS 1-9: 4

## 2024-08-07 ASSESSMENT — LIFESTYLE VARIABLES
HOW OFTEN DO YOU HAVE A DRINK CONTAINING ALCOHOL: MONTHLY OR LESS
HOW MANY STANDARD DRINKS CONTAINING ALCOHOL DO YOU HAVE ON A TYPICAL DAY: 3 OR 4

## 2024-08-07 ASSESSMENT — SLEEP AND FATIGUE QUESTIONNAIRES
SLEEP PATTERN: DIFFICULTY FALLING ASLEEP;RESTLESSNESS;EARLY AWAKENING
DO YOU USE A SLEEP AID: NO
DO YOU HAVE DIFFICULTY SLEEPING: YES
AVERAGE NUMBER OF SLEEP HOURS: 4

## 2024-08-07 ASSESSMENT — SOCIAL DETERMINANTS OF HEALTH (SDOH)
WITHIN THE LAST YEAR, HAVE TO BEEN RAPED OR FORCED TO HAVE ANY KIND OF SEXUAL ACTIVITY BY YOUR PARTNER OR EX-PARTNER?: NO
WITHIN THE LAST YEAR, HAVE YOU BEEN KICKED, HIT, SLAPPED, OR OTHERWISE PHYSICALLY HURT BY YOUR PARTNER OR EX-PARTNER?: NO
WITHIN THE LAST YEAR, HAVE YOU BEEN AFRAID OF YOUR PARTNER OR EX-PARTNER?: NO
WITHIN THE LAST YEAR, HAVE YOU BEEN HUMILIATED OR EMOTIONALLY ABUSED IN OTHER WAYS BY YOUR PARTNER OR EX-PARTNER?: NO

## 2024-08-07 NOTE — ED PROVIDER NOTES
[facility] 6 ADULT [facility]  eMERGENCY dEPARTMENT eNCOUnter      Pt Name: Pablo Castillo  MRN: [id number]  Birthdate 1995  Date of evaluation: 8/6/2024  Provider: Leonel Jones MD    CHIEF COMPLAINT       Chief Complaint   Patient presents with    Mental Health Problem     Pt states he is depressed and his anxiety is up- denies SI/HI at this time         HISTORY OF PRESENT ILLNESS   (Location/Symptom, Timing/Onset,Context/Setting, Quality, Duration, Modifying Factors, Severity)  Note limiting factors.   Pablo Castillo is a 28 y.o. male who presents to the emergency department with multiple complaints.  Patient primary complaint is feeling depressed and suicidal thoughts.  No specific plan.  Said that he has had problems with depression for some time and has been gradually worsening due to problems with relationships.  No HI.  No hallucinations or delusions.  Takes Effexor and Seroquel.  Has been compliant with these.  Last night was feeling more depressed and drank some alcohol.  Has not been drinking today.  Also complains of mild abdominal pain.  No nausea vomiting or diarrhea.  Denies any urinary complaints.  Tells me he was diagnosed with UTI once before.  No history of kidney stones.  No cough chest pain or difficulty breathing.  No fevers.    HPI    NursingNotes were reviewed.    REVIEW OF SYSTEMS    (2-9 systems for level 4, 10 or more for level 5)     Review of Systems   Constitutional:  Positive for fatigue. Negative for fever.   Eyes:  Negative for pain.   Respiratory:  Negative for shortness of breath.    Cardiovascular:  Negative for chest pain and palpitations.   Gastrointestinal:  Positive for abdominal pain (generalized). Negative for diarrhea and vomiting.   Genitourinary:  Negative for dysuria.   Skin:  Negative for rash.   Neurological:  Negative for weakness and headaches.   Psychiatric/Behavioral:  Positive for suicidal ideas. Negative for hallucinations.    All other systems reviewed and are  as well.  Patient resting comfortably nontoxic on exam at this time.  Patient updated on plan    CONSULTS:  IP CONSULT TO FAMILY MEDICINE    PROCEDURES:  Unless otherwise notedbelow, none     Procedures    FINAL IMPRESSION     1. Depression with suicidal ideation    2. Urinary tract infection without hematuria, site unspecified    3. Generalized abdominal pain          DISPOSITION/PLAN   DISPOSITION Decision To Admit 08/07/2024 03:15:29 AM      PATIENT REFERRED TO:  @FUP@    DISCHARGE MEDICATIONS:  There are no discharge medications for this patient.         (Please note that portions of this note were completed with a voice recognition program.  Efforts were made to edit the dictations butoccasionally words are mis-transcribed.)    Leonel Jones MD (electronically signed)  AttendingEmer[facility] Physician          Leonel Jones MD  08/07/24 3753

## 2024-08-07 NOTE — DISCHARGE INSTRUCTIONS
medical condition or this instruction, always ask your healthcare professional. Healthwise, [facility] disclaims any warranty or liability for your use of this information.         Suicidal Thoughts and Behavior: Care Instructions  Overview  You have been seen by a doctor because you've had thoughts of suicide or have harmed yourself. Your doctor and support team want to help keep you safe. Your team may include a , a , and a counselor.  People often think about suicide because they feel hopeless, helpless, or worthless. These feelings may come from having a mental health problem, such as depression. These problems can be treated.  It's important to remember that there are people who care about you. Your doctor and support team take your pain very seriously, and they want to help. Treatment and close follow-up care can help you feel better.  Follow-up care is a key part of your treatment and safety. Be sure to make and go to all appointments, and call your doctor if you are having problems. It's also a good idea to know your test results and keep a list of the medicines you take.  How can you care for yourself at home?  Where to get help 24 hours a day, 7 days a week   If you or someone you know talks about suicide, self-harm, a mental health crisis, a substance use crisis, or any other kind of emotional distress, get help right away. You can:  Call the Suicide and Crisis Lifeline at 548.  Call 9-287-173-TALK (1-589.277.3629).  Text HOME to 748250 to access the Crisis Text Line.  Consider saving these numbers in your phone.  Go to Cybits.org for more information or to chat online.  Other things you can do   Talk to someone. Be open about your feelings. Reach out to a trusted family member or friend, your doctor, or a counselor.  Attend all counseling sessions recommended by your doctor.  Make a suicide safety plan. This is a set of steps you can take when you feel suicidal. It  [address]  [phone]  [facility]   [phone]  [facility]   [phone]    [facility]   Typically serve a daily meal and serve as point of contact for Meals on Wheels program  [facility]  [address] [place], [place] [address] [phone]    Food Pantry  Food distribution. Most require person to bring ID/documentation. Contact for information.    [facility]  [address] [place], [place] [address] [phone]  [facility]  [address] [facility] [place], [place] [address] [phone]     [facility]  Typically serve a daily lunch during the week- contact for meal times.  [employer]  [address], [place] [address] [phone]  Serving a free lunch from 11:00 AM to 1:00 PM Monday through Friday. Please call to see if meal delivery is an option.    Food Pantry  Food distribution. Most require person to bring ID/documentation. Contact for information.    [address] [address]  [phone]  [facility]  [address] [place], [place] [address]  [phone]  [facility]  [address] [place] [address]  [phone]  [facility]  [address] [place] [address]  [phone]  [facility]  [address] [place], [place] [address] [phone]    [employer]  Community supported miniature pantry filled with non-perishables. Take what you need.   [facility]   (located on walkway from patient parking to office entrance)  [address] [place], [place] [address]

## 2024-08-07 NOTE — ED NOTES
ED TO INPATIENT SBAR HANDOFF    Patient Name: Pablo Csatillo   : 1995  28 y.o.   Family/Caregiver Present: No  Code Status Order: No Order    C-SSRS: Risk of Suicide: No Risk  Sitter Yes  Restraints:         Situation  Chief Complaint:   Chief Complaint   Patient presents with    Mental Health Problem     Pt states he is depressed and his anxiety is up- denies SI/HI at this time     Patient Diagnosis: No admission diagnoses are documented for this encounter.     Brief Description of Patient's Condition: Pablo Castillo is a 28 y.o. male who presents to the emergency department with multiple complaints.  Patient primary complaint is feeling depressed and suicidal thoughts.  No specific plan.  Said that he has had problems with depression for some time and has been gradually worsening due to problems with relationships.  No HI.  No hallucinations or delusions.  Takes Effexor and Seroquel.  Has been compliant with these.  Last night was feeling more depressed and drink some alcohol.  Has not been drinking today.  Also complains of mild abdominal pain.  No nausea vomiting or diarrhea.  Denies any urinary complaints.  Tells me he was diagnosed with UTI once before   Mental Status: oriented, alert, and coherent  Arrived from: home    Imaging:   CT ABDOMEN PELVIS W IV CONTRAST Additional Contrast? None   Final Result   Impression:   1.  No inflammatory process, bowel or urinary obstruction.  No acute abnormalities.        All CT scans are performed using dose optimization techniques as appropriate to the performed exam and include    at least one of the following: Automated exposure control, adjustment of the mA and/or kV according to size, and the use of iterative reconstruction technique.        ______________________________________    Electronically signed by: PADMA HAINES M.D.   Date:     2024   Time:    00:28         COVID-19 Results:   Internal Administration   First Dose      Second Dose          Route  IntraVENous Administered By  Oswaldo Vazquez RN            History: No past medical history on file.    Assessment  Vitals: Level of Consciousness: Alert (0)   Vitals:    08/06/24 2218   BP: (!) 138/98   Pulse: (!) 109   Resp: 17   Temp: 98.1 °F (36.7 °C)   TempSrc: Temporal   SpO2: 98%   Weight: 72.6 kg (160 lb)   Height: 1.651 m (5' 5\")     Predictive Model Details   No score data available for Deterioration Index      NPO? No  O2 Flow Rate: O2 Device: None (Room air)    Cardiac Rhythm:   NIH Score: NIH     Active LDA's:    Pertinent or High Risk Medications/Drips: no   If Yes, please provide details:   Blood Product Administration: no  If Yes, please provide details:   Sepsis Risk Score      Admitted with Sepsis? No    Recommendation  Incomplete orders:   Patient Belongings:   Additional Comments:   If any further questions, please call Sending RN at [phone]    Electronically signed by: Electronically signed by Oswaldo Vazquez RN on 8/7/2024 at 3:40 AM
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
## 2024-08-07 NOTE — DISCHARGE INSTR - LAB
[facility]  Substance Use Resources*    Crisis Resources  988 Suicide and Crisis Hotline  If you or someone you know needs support now, call or text 988 or chat FreeGameCredits.org    Suicide Prevention Lifeline  2-341-088-MKWZ(9549)    Crisis Text Link  Text [place] to 680602    [facility] Prevention [facility]  Emergency Crisis Intervention Line   [phone]        [facility]  Any person that voluntarily requests help with substance addiction can be transported by a 's deputy to a treatment center that is partnering with [facility]. The deputy will provide additional assistance in starting the program.  Call the [facility] Sheriff's Office at [phone] and ask for [facility].    Substance Use Resources and Providers     [facility]   \" [facility]  [email or web address]   Patients under 18 years old accepted    Services: Addiction outpatient services, substance abuse professionals, corporate crisis response, workplace wellness  Accepts Medicaid, Medicare, Most Private Insurances  o [facility]  [address] [place] [address]; [place] [address]  [phone] option 6  o [facility]  [address] [facility] [place] [facility]; [place] [address]  [phone] option 5  o [facility] - Information Age  [address]; [place] [address]  [phone] option 7  \" [facility]   [address] [facility] [address] [place] [id number] [phone]   [email or web address]   Group Therapy, Individual Therapy, EMDR, [facility] Therapy   Patients under 18 years old accepted   Self Pay- offers a sliding scale fee  \" [facility])   [address] [place] [address]   [email or web address]   [phone]   Walk-ins welcome Monday-Friday 8:00AM-5:00PM   Early Onset Psychosis, Youth Drop in Center, Specialized for children and substance  Lifeline  1-475-204-SOXV(9470)    Crisis Text Link  Text [place] to 649936    [facility]  Emergency Crisis Intervention Line   [phone]    Mental Health Providers     [facility]   [facility]   [address] [facility] [address]   www.[email or web address]   [phone]   Trauma (Child/Adolescent/Teen)  Patients under 18 years old accepted   Free Service  [facility]   [address] [place] [address] [address]   [phone]   Individual, couple, family counseling, children's counseling, depression, anxiety etc.  Patients under 18 years old accepted   Accepts Medicaid, Medicare, most Commercial Insurances  [facility]   [address] [place] [facility] [address]   [email or web address]   [phone]   Patients under 18 years old accepted   Accepts Medicaid, Multiple Commercial   [facility]   [facility] [place] [address]   [email or web address]   [phone]   Patients under 18 years old accepted    Accepts Most Commercial Insurances, [place] Medicaid, Medicare  [facility] [place] [facility]  [email or web address]   Patients under 18 years old accepted    Accepts Medicaid, Medicare, Most Private Insurances  [facility] [place] [facility]  [address] [facility]; [place]  [address]  [phone] option 6  [facility] [place] [address]; [place]  [address]  [phone] option 5  [facility] - Information Age  [address]; [place] [address]  [phone] option 7  Family Psychiatric Services, Outpatient Behavioral Health   [address] [facility]  [address] [facility] [address]   [email or web address]   [phone]   Patients under 18 years old accepted   Accepts Medicaid, Medicare, and Private Insurance   [facility]   [address] [facility] [address] [facility]

## 2024-08-07 NOTE — PLAN OF CARE
Problem: Coping  Goal: Pt/Family able to verbalize concerns and demonstrate effective coping strategies  Description: INTERVENTIONS:  1. Assist patient/family to identify coping skills, available support systems and cultural and spiritual values  2. Provide emotional support, including active listening and acknowledgement of concerns of patient and caregivers  3. Reduce environmental stimuli, as able  4. Instruct patient/family in relaxation techniques, as appropriate  5. Assess for spiritual pain/suffering and initiate Spiritual Care, Psychosocial Clinical Specialist consults as needed  8/7/2024 1205 by Angelique Henao  Outcome: Progressing  Note:                                                                     Group Therapy Note    Date: 8/7/2024  Start Time: 1100  End Time:  1130  Number of Participants: 5    Type of Group: Healthy Living/Wellness    Wellness Binder Information  Module Name:  Stress  Session Number:  5    Group Goal for Pt:  To raise awareness of the effectiveness of diversionary coping skills    Notes:  Pt demonstrated improved awareness of the effectiveness of diversionary coping skills by actively participating in group activity.    Status After Intervention:  Unchanged    Participation Level: Active Listener    Participation Quality: Appropriate      Speech:  normal      Thought Process/Content: Logical      Affective Functioning: Congruent      Mood: anxious and depressed      Level of consciousness:  Alert and Oriented x4      Response to Learning: Able to verbalize current knowledge/experience, Able to verbalize/acknowledge new learning, and Progressing to goal      Endings: None Reported    Modes of Intervention: Education      Discipline Responsible: Psychoeducational Specialist      Signature:  Angelique Henao

## 2024-08-07 NOTE — PLAN OF CARE
Problem: Coping  Goal: Pt/Family able to verbalize concerns and demonstrate effective coping strategies  Description: INTERVENTIONS:  1. Assist patient/family to identify coping skills, available support systems and cultural and spiritual values  2. Provide emotional support, including active listening and acknowledgement of concerns of patient and caregivers  3. Reduce environmental stimuli, as able  4. Instruct patient/family in relaxation techniques, as appropriate  5. Assess for spiritual pain/suffering and initiate Spiritual Care, Psychosocial Clinical Specialist consults as needed  Outcome: Progressing  Note:                                                                     Group Therapy Note    Date: 8/7/2024  Start Time: 1000  End Time:  1030  Number of Participants: 4    Type of Group: Psychoeducation    Wellness Binder Information  Module Name:  Women's Issues  Session Number:  4    Group Goal for Pt:  To raise awareness of how thoughts influence feelings    Notes:  Pt demonstrated improved awareness of how thoughts influence feelings by actively participating in group discussion.    Status After Intervention:  Unchanged    Participation Level: Minimal    Participation Quality: Appropriate      Speech:  normal      Thought Process/Content: Logical      Affective Functioning: Flat      Mood: anxious and depressed      Level of consciousness:  Alert and Oriented x4      Response to Learning: Able to verbalize current knowledge/experience, Able to verbalize/acknowledge new learning, and Progressing to goal      Endings: None Reported    Modes of Intervention: Education      Discipline Responsible: Psychoeducational Specialist      Signature:  Angelique Henao

## 2024-08-07 NOTE — PLAN OF CARE
Problem: Self Harm/Suicidality  Goal: Will have no self-injury during hospital stay  Description: INTERVENTIONS:  1.  Ensure constant observer at bedside with Q15M safety checks  2.  Maintain a safe environment  3.  Secure patient belongings  4.  Ensure family/visitors adhere to safety recommendations  5.  Ensure safety tray has been added to patient's diet order  6.  Every shift and PRN: Re-assess suicidal risk via Frequent Screener    Outcome: Progressing  Flowsheets (Taken 8/7/2024 1328)  Will have no self-injury during hospital stay:   Ensure safety tray has been added to patient's diet order   Ensure constant observer at bedside with Q15M safety checks   Maintain a safe environment   Every shift and PRN: Re-assess suicidal risk via Frequent Screener   Secure patient belongings   Ensure family/visitors adhere to safety recommendations     Problem: Depression  Goal: Will be euthymic at discharge  Description: INTERVENTIONS:  1. Administer medication as ordered  2. Provide emotional support via 1:1 interaction with staff  3. Encourage involvement in milieu/groups/activities  4. Monitor for social isolation  Outcome: Progressing     Problem: Anxiety  Goal: Will report anxiety at manageable levels  Description: INTERVENTIONS:  1. Administer medication as ordered  2. Teach and rehearse alternative coping skills  3. Provide emotional support with 1:1 interaction with staff  Outcome: Progressing  Flowsheets (Taken 8/7/2024 1328)  Will report anxiety at manageable levels:   Administer medication as ordered   Teach and rehearse alternative coping skills   Provide emotional support with 1:1 interaction with staff     Problem: Drug Abuse/Detox  Goal: Will have no detox symptoms and will verbalize plan for changing drug-related behavior  Description: INTERVENTIONS:  1. Administer medication as ordered  2. Monitor physical status  3. Provide emotional support with 1:1 interaction with staff  4. Encourage  recovery focused  Able to verbalize current knowledge/experience, Able to verbalize/acknowledge new learning, and Progressing to goal      Endings: None Reported    Modes of Intervention: Education      Discipline Responsible: Psychoeducational Specialist      Signature:  Angelique Henao

## 2024-08-08 LAB
25(OH)D3 SERPL-MCNC: 20.3 NG/ML
BACTERIA BLD CULT ORG #2: NORMAL
BACTERIA BLD CULT: NORMAL
BACTERIA UR CULT: NORMAL
TSH SERPL DL<=0.005 MIU/L-ACNC: 0.99 UIU/ML (ref 0.27–4.2)
VIT B12 SERPL-MCNC: 203 PG/ML (ref 211–946)

## 2024-08-08 PROCEDURE — 99232 SBSQ HOSP IP/OBS MODERATE 35: CPT | Performed by: PSYCHIATRY & NEUROLOGY

## 2024-08-08 PROCEDURE — 1240000000 HC EMOTIONAL WELLNESS R&B

## 2024-08-08 PROCEDURE — 6370000000 HC RX 637 (ALT 250 FOR IP): Performed by: PSYCHIATRY & NEUROLOGY

## 2024-08-08 PROCEDURE — 82306 VITAMIN D 25 HYDROXY: CPT

## 2024-08-08 PROCEDURE — 84443 ASSAY THYROID STIM HORMONE: CPT

## 2024-08-08 PROCEDURE — 36415 COLL VENOUS BLD VENIPUNCTURE: CPT

## 2024-08-08 PROCEDURE — 82607 VITAMIN B-12: CPT

## 2024-08-08 RX ADMIN — NICOTINE POLACRILEX 2 MG: 2 LOZENGE ORAL at 19:43

## 2024-08-08 RX ADMIN — QUETIAPINE FUMARATE 50 MG: 50 TABLET ORAL at 20:52

## 2024-08-08 RX ADMIN — VENLAFAXINE HYDROCHLORIDE 75 MG: 75 CAPSULE, EXTENDED RELEASE ORAL at 08:24

## 2024-08-08 RX ADMIN — NICOTINE POLACRILEX 2 MG: 2 LOZENGE ORAL at 16:33

## 2024-08-08 NOTE — H&P
HISTORY and PHYSICAL      CHIEF COMPLAINT:  SI    Reason for Admission:  SI    History Obtained From:  patient, chart    HISTORY OF PRESENT ILLNESS:      The patient is a 28 y.o. male who is admitted to the [facility] unit with worsening mood issues. He has had no c/o chest pain or SOA. He has had no abdominal pain or N/V. He has no dysuria. He has had no new pain issues. No fever.     Past Medical History:    History reviewed. No pertinent past medical history.  Past Surgical History:    History reviewed. No pertinent surgical history.      Medications Prior to Admission:    No medications prior to admission.    Allergies:  Amphetamine-dextroamphetamine, Cyclobenzaprine, Ketorolac tromethamine, Penicillins, and Diphenhydramine hcl (sleep)    Social History:   TOBACCO:   reports that he has been smoking cigarettes. He does not have any smokeless tobacco history on file.  ETOH:   has no history on file for alcohol use.  DRUGS:   has no history on file for drug use.  MARITAL STATUS:  single  OCCUPATION:  disabled  Patient currently lives with family       Family History:   History reviewed. No pertinent family history.  REVIEW OF SYSTEMS:  Constitutional: neg  CV: neg  Pulmonary: neg  GI: neg  : neg  Psych: SI  Neuro: neg  Skin: neg  MusculoSkeletal: neg  HEENT: neg  Joints: neg    Vitals:  BP 99/66   Pulse 91   Temp 98.6 °F (37 °C) (Temporal)   Resp 16   Ht 1.651 m (5' 5\")   Wt 74.2 kg (163 lb 9.6 oz)   SpO2 97%   BMI 27.22 kg/m²     PHYSICAL EXAM:  Gen: NAD, alert  HEENT: WNL  Lymph: no LAD  Neck: no JVD or masses  Chest: CTA bilat  CV: RRR  Abdomen: NT/ND  Extrem: no C/C/E  Neuro: non focal  Skin: no rashes  Joints: no redness    DATA:  I have reviewed the admission labs and imaging tests.    ASSESSMENT AND PLAN:      Principal Problem:    Depression with suicidal ideation---follow with Psych    Elevated LFT        Jer Field MD  8:29 AM 8/8/2024

## 2024-08-08 NOTE — PLAN OF CARE
Problem: Self Harm/Suicidality  Goal: Will have no self-injury during hospital stay  Description: INTERVENTIONS:  1.  Ensure constant observer at bedside with Q15M safety checks  2.  Maintain a safe environment  3.  Secure patient belongings  4.  Ensure family/visitors adhere to safety recommendations  5.  Ensure safety tray has been added to patient's diet order  6.  Every shift and PRN: Re-assess suicidal risk via Frequent Screener    Outcome: Progressing     Problem: Depression  Goal: Will be euthymic at discharge  Description: INTERVENTIONS:  1. Administer medication as ordered  2. Provide emotional support via 1:1 interaction with staff  3. Encourage involvement in milieu/groups/activities  4. Monitor for social isolation  Outcome: Progressing     Problem: Anxiety  Goal: Will report anxiety at manageable levels  Description: INTERVENTIONS:  1. Administer medication as ordered  2. Teach and rehearse alternative coping skills  3. Provide emotional support with 1:1 interaction with staff  Outcome: Progressing     Problem: Drug Abuse/Detox  Goal: Will have no detox symptoms and will verbalize plan for changing drug-related behavior  Description: INTERVENTIONS:  1. Administer medication as ordered  2. Monitor physical status  3. Provide emotional support with 1:1 interaction with staff  4. Encourage  recovery focused treatment   Outcome: Progressing     Problem: Sleep Disturbance  Goal: Will exhibit normal sleeping pattern  Description: INTERVENTIONS:  1. Administer medication as ordered  2. Decrease environmental stimuli, including noise, as appropriate  3. Discourage social isolation and naps during the day  Outcome: Progressing     Problem: Coping  Goal: Pt/Family able to verbalize concerns and demonstrate effective coping strategies  Description: INTERVENTIONS:  1. Assist patient/family to identify coping skills, available support systems and cultural and spiritual values  2. Provide emotional support,

## 2024-08-08 NOTE — RESEARCH
SW found a release in pt's chart for his cousin, Ming Eric, but the pt said the contact number is unknown. So collateral cannot be completed at this time.

## 2024-08-09 VITALS
TEMPERATURE: 97 F | HEIGHT: 65 IN | OXYGEN SATURATION: 97 % | SYSTOLIC BLOOD PRESSURE: 103 MMHG | DIASTOLIC BLOOD PRESSURE: 61 MMHG | BODY MASS INDEX: 27.26 KG/M2 | RESPIRATION RATE: 16 BRPM | WEIGHT: 163.6 LBS | HEART RATE: 79 BPM

## 2024-08-09 LAB
CHOLEST SERPL-MCNC: 192 MG/DL (ref 160–199)
HBA1C MFR BLD: 5.1 % (ref 4–6)
HDLC SERPL-MCNC: 43 MG/DL (ref 55–121)
LDLC SERPL CALC-MCNC: 125 MG/DL
TRIGL SERPL-MCNC: 120 MG/DL (ref 0–149)

## 2024-08-09 PROCEDURE — 36415 COLL VENOUS BLD VENIPUNCTURE: CPT

## 2024-08-09 PROCEDURE — 80061 LIPID PANEL: CPT

## 2024-08-09 PROCEDURE — 83036 HEMOGLOBIN GLYCOSYLATED A1C: CPT

## 2024-08-09 PROCEDURE — 6370000000 HC RX 637 (ALT 250 FOR IP): Performed by: PSYCHIATRY & NEUROLOGY

## 2024-08-09 PROCEDURE — 5130000000 HC BRIDGE APPOINTMENT

## 2024-08-09 PROCEDURE — 99239 HOSP IP/OBS DSCHRG MGMT >30: CPT | Performed by: PSYCHIATRY & NEUROLOGY

## 2024-08-09 RX ORDER — CYANOCOBALAMIN 1000 UG/ML
1000 INJECTION, SOLUTION INTRAMUSCULAR; SUBCUTANEOUS
Status: DISCONTINUED | OUTPATIENT
Start: 2024-09-13 | End: 2024-08-09 | Stop reason: HOSPADM

## 2024-08-09 RX ORDER — CYANOCOBALAMIN 1000 UG/ML
1000 INJECTION, SOLUTION INTRAMUSCULAR; SUBCUTANEOUS DAILY
Status: DISCONTINUED | OUTPATIENT
Start: 2024-08-09 | End: 2024-08-09 | Stop reason: HOSPADM

## 2024-08-09 RX ORDER — ERGOCALCIFEROL 1.25 MG/1
50000 CAPSULE ORAL WEEKLY
Qty: 5 CAPSULE | Refills: 0 | Status: SHIPPED | OUTPATIENT
Start: 2024-08-09 | End: 2024-10-26

## 2024-08-09 RX ORDER — QUETIAPINE FUMARATE 50 MG/1
50 TABLET, FILM COATED ORAL NIGHTLY
Qty: 30 TABLET | Refills: 0 | Status: SHIPPED | OUTPATIENT
Start: 2024-08-09

## 2024-08-09 RX ORDER — ERGOCALCIFEROL 1.25 MG/1
50000 CAPSULE ORAL WEEKLY
Status: DISCONTINUED | OUTPATIENT
Start: 2024-08-09 | End: 2024-08-09 | Stop reason: HOSPADM

## 2024-08-09 RX ORDER — CYANOCOBALAMIN 1000 UG/ML
1000 INJECTION, SOLUTION INTRAMUSCULAR; SUBCUTANEOUS WEEKLY
Status: DISCONTINUED | OUTPATIENT
Start: 2024-08-16 | End: 2024-08-09 | Stop reason: HOSPADM

## 2024-08-09 RX ORDER — TRAZODONE HYDROCHLORIDE 50 MG/1
50 TABLET ORAL NIGHTLY PRN
Qty: 30 TABLET | Refills: 0 | Status: SHIPPED | OUTPATIENT
Start: 2024-08-09

## 2024-08-09 RX ORDER — VENLAFAXINE HYDROCHLORIDE 75 MG/1
75 CAPSULE, EXTENDED RELEASE ORAL
Qty: 30 CAPSULE | Refills: 0 | Status: SHIPPED | OUTPATIENT
Start: 2024-08-10

## 2024-08-09 RX ORDER — MECOBALAMIN 5000 MCG
5 TABLET,DISINTEGRATING ORAL NIGHTLY PRN
Qty: 30 TABLET | Refills: 0 | Status: SHIPPED | OUTPATIENT
Start: 2024-08-09

## 2024-08-09 RX ADMIN — VENLAFAXINE HYDROCHLORIDE 75 MG: 75 CAPSULE, EXTENDED RELEASE ORAL at 07:50

## 2024-08-09 NOTE — PLAN OF CARE
Encourage  recovery focused treatment   8/9/2024 1402 by Misty Servin RN  Outcome: Adequate for Discharge  8/9/2024 0856 by Misty Servin RN  Outcome: Adequate for Discharge  8/9/2024 0855 by Misty Servin RN  Outcome: Adequate for Discharge     Problem: Sleep Disturbance  Goal: Will exhibit normal sleeping pattern  Description: INTERVENTIONS:  1. Administer medication as ordered  2. Decrease environmental stimuli, including noise, as appropriate  3. Discourage social isolation and naps during the day  8/9/2024 1402 by Misty Servin RN  Outcome: Adequate for Discharge  8/9/2024 0856 by Misty Servin RN  Outcome: Adequate for Discharge  8/9/2024 0855 by Misty Servin RN  Outcome: Adequate for Discharge     Problem: Coping  Goal: Pt/Family able to verbalize concerns and demonstrate effective coping strategies  Description: INTERVENTIONS:  1. Assist patient/family to identify coping skills, available support systems and cultural and spiritual values  2. Provide emotional support, including active listening and acknowledgement of concerns of patient and caregivers  3. Reduce environmental stimuli, as able  4. Instruct patient/family in relaxation techniques, as appropriate  5. Assess for spiritual pain/suffering and initiate Spiritual Care, Psychosocial Clinical Specialist consults as needed  8/9/2024 1402 by Misty Servin RN  Outcome: Adequate for Discharge  8/9/2024 0856 by Misty Servin RN  Outcome: Adequate for Discharge  8/9/2024 0855 by Misty Servin RN  Outcome: Adequate for Discharge

## 2024-08-09 NOTE — PLAN OF CARE
Problem: Self Harm/Suicidality  Goal: Will have no self-injury during hospital stay  Description: INTERVENTIONS:  1.  Ensure constant observer at bedside with Q15M safety checks  2.  Maintain a safe environment  3.  Secure patient belongings  4.  Ensure family/visitors adhere to safety recommendations  5.  Ensure safety tray has been added to patient's diet order  6.  Every shift and PRN: Re-assess suicidal risk via Frequent Screener    8/9/2024 0856 by Misty Servin RN  Outcome: Adequate for Discharge  8/9/2024 0855 by Misty Servin RN  Outcome: Adequate for Discharge     Problem: Depression  Goal: Will be euthymic at discharge  Description: INTERVENTIONS:  1. Administer medication as ordered  2. Provide emotional support via 1:1 interaction with staff  3. Encourage involvement in milieu/groups/activities  4. Monitor for social isolation  8/9/2024 0856 by Misty Servin RN  Outcome: Adequate for Discharge  8/9/2024 0855 by Misty Servin RN  Outcome: Adequate for Discharge     Problem: Anxiety  Goal: Will report anxiety at manageable levels  Description: INTERVENTIONS:  1. Administer medication as ordered  2. Teach and rehearse alternative coping skills  3. Provide emotional support with 1:1 interaction with staff  8/9/2024 0856 by Misty Servin RN  Outcome: Adequate for Discharge  8/9/2024 0855 by Misty Servin RN  Outcome: Adequate for Discharge     Problem: Drug Abuse/Detox  Goal: Will have no detox symptoms and will verbalize plan for changing drug-related behavior  Description: INTERVENTIONS:  1. Administer medication as ordered  2. Monitor physical status  3. Provide emotional support with 1:1 interaction with staff  4. Encourage  recovery focused treatment   8/9/2024 0856 by Misty Servin RN  Outcome: Adequate for Discharge  8/9/2024 0855 by Misty Servin RN  Outcome: Adequate for Discharge     Problem: Sleep Disturbance  Goal: Will exhibit normal sleeping

## 2024-08-09 NOTE — PLAN OF CARE
Problem: Anxiety  Goal: Will report anxiety at manageable levels  8/9/2024 1142 by Estelle Fernandez  Outcome: Progressing      Group Therapy Note     Date: 8/9/2024  Start Time: 1100  End Time:  1130  Number of Participants: 6     Type of Group: Psychoeducation     Wellness Binder Information  Module Name:  emotional wellness  Session Number:  5     Patient's Goal:  obstacles to emotional wellness     Notes:  pt acknowledged negative thinking as an obstacle to emotional wellness.     Status After Intervention:  Improved     Participation Level: Interactive     Participation Quality: Appropriate, Attentive, and Sharing        Speech:  normal        Thought Process/Content: Logical        Affective Functioning: Congruent        Mood: congruent        Level of consciousness:  Alert, Oriented x4, and Attentive        Response to Learning: Able to verbalize current knowledge/experience        Endings: None Reported     Modes of Intervention: Education        Discipline Responsible: Psychoeducational Specialist        Signature:  Estelle Fernandez

## 2024-08-09 NOTE — PLAN OF CARE
Problem: Self Harm/Suicidality  Goal: Will have no self-injury during hospital stay  Description: INTERVENTIONS:  1.  Ensure constant observer at bedside with Q15M safety checks  2.  Maintain a safe environment  3.  Secure patient belongings  4.  Ensure family/visitors adhere to safety recommendations  5.  Ensure safety tray has been added to patient's diet order  6.  Every shift and PRN: Re-assess suicidal risk via Frequent Screener    Outcome: Adequate for Discharge     Problem: Depression  Goal: Will be euthymic at discharge  Description: INTERVENTIONS:  1. Administer medication as ordered  2. Provide emotional support via 1:1 interaction with staff  3. Encourage involvement in milieu/groups/activities  4. Monitor for social isolation  Outcome: Adequate for Discharge     Problem: Anxiety  Goal: Will report anxiety at manageable levels  Description: INTERVENTIONS:  1. Administer medication as ordered  2. Teach and rehearse alternative coping skills  3. Provide emotional support with 1:1 interaction with staff  Outcome: Adequate for Discharge     Problem: Drug Abuse/Detox  Goal: Will have no detox symptoms and will verbalize plan for changing drug-related behavior  Description: INTERVENTIONS:  1. Administer medication as ordered  2. Monitor physical status  3. Provide emotional support with 1:1 interaction with staff  4. Encourage  recovery focused treatment   Outcome: Adequate for Discharge     Problem: Sleep Disturbance  Goal: Will exhibit normal sleeping pattern  Description: INTERVENTIONS:  1. Administer medication as ordered  2. Decrease environmental stimuli, including noise, as appropriate  3. Discourage social isolation and naps during the day  Outcome: Adequate for Discharge     Problem: Coping  Goal: Pt/Family able to verbalize concerns and demonstrate effective coping strategies  Description: INTERVENTIONS:  1. Assist patient/family to identify coping skills, available support systems and cultural and

## 2024-08-09 NOTE — DISCHARGE SUMMARY
Patient ID:  Pablo Castillo  [id number]  28 y.o.  1995    Admit date: 8/6/2024  Discharge date: 8/9/2024    Admitting Physician: Mari Salgado MD   Attending Physician: Hanane Bright MD  Discharge Provider: HANANE BRIGHT MD     Admission Diagnoses: Depression with suicidal ideation [F32.A, R45.851]  Urinary tract infection without hematuria, site unspecified [N39.0]  Unspecified mood (affective) disorder ([facility]) [F39]    Discharge Diagnoses: Mood disorder, unspecified  Cluster B personality disorder  History of major depressive disorder  History of anxiety  Tobacco use disorder  Treatment noncompliance    Admission Condition: poor    Discharged Condition: stable    Indication for Admission: Suicidal ideations    HPI:   The patient is a 28 y.o. disabled male with previous psychiatric history of depression, anxiety and questionable PTSD, who has been admitted to our psychiatric unit secondary to suicidal ideations.     Patient has been seen in treatment team room with presence of patient's nurse.  He reported that he has been suffering from mental health illnesses since childhood and has been admitted to multiple psychiatric facilities and has been prescribed multiple psychotropic medications, stated that most recently he has been prescribed Effexor and Seroquel, however, does not remember the dosages.  He stated that he was provided with 2-week supply of medications 1 month ago, however, he did not go to his appointments and did not get a refill of his medications, because he did not have transportation.     Patient reported that some girl contacted him on Facebook a few days ago and invited him to stay with her in [place], however, patient stated that that girl was using methamphetamine and drinking alcohol and yesterday she kicked him out.  Patient stated that he did not have any place to go and he came to the hospital.  During the interview patient asked to be provided with transportation back to his home in

## 2024-08-10 ENCOUNTER — FOLLOWUP TELEPHONE ENCOUNTER (OUTPATIENT)
Dept: PSYCHIATRY | Age: 29
End: 2024-08-10

## 2024-08-10 NOTE — PROGRESS NOTES
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 said in [place].    Collateral unable to be obtained because pt didn't know his cousin's contact number.    Pt is attending groups.    Pt is compliant with medications.    Pt denies SI, HI, & AVH at this time.    Pt rates his depression 0/10.    Pt rates his anxiety 7/10.    Pt has been appropriately dressed and healthy looking.    Pt has been social and appetite is good.  
“Reviewed and confirmed discharge appointments and follow-up with patient. Patient verbalized an understanding of follow-up. Patient will follow with [facility], or [place], [place] in one week. Walkin intake/therapy M-F 8:30am - 4:00pm. Patient denies SI/HI. Patient denies A/VH. Patient is leaving our unit in overall good condition with no complaints or concerns at this time. Family/friend/mother/father/son/daughter/grandma is here in the reception area to pick the patient up.”  
Exaggerated  Affect: Congruent  Level of Consciousness: Alert  Frequency of Checks: 4 times per hour, close  Mood:Normal: No  Mood: Depressed, Anxious  Motor Activity:Normal: Yes  Eye Contact: Fair  Observed Behavior: Cooperative  Sexual Misconduct History: Current - no  Preception: [place] to person, [place] to time, [place] to place, [place] to situation  Attention:Normal: No  Attention: Distractible  Thought Processes: Circumstantial  Thought Content:Normal: Yes  Depression Symptoms: Appetite change, Change in energy level, Feelings of helplessness, Feelings of hopelessess, Feelings of worthlessness, Sleep disturbance, Loss of interest, Isolative, Increased irritability, Impaired concentration  Anxiety Symptoms: Generalized  Radha Symptoms: No problems reported or observed.  Hallucinations: None  Delusions: No  Memory:Normal: Yes  Insight and Judgment: No  Insight and Judgment: Poor insight, Unmotivated, Poor judgment    Protective Factors:  Patient identifies protective factors with nursing staff as follows:   Identifies reasons for living: No   Supportive Social Network or family: No    Belief that suicide is immoral/high spirituality: No   Responsibility to family or others/living with family: No   Fear of death or dying due to pain and suffering: No   Engaged in work or school: No               If Patient is unable to identify, reason why? NA    PATIENT STRENGTHS and Barriers:   Patient Strengths/Barriers  Strengths (Must Choose Two): Motivation level for treatment, Previous positive response to treatment  Barriers: Independent living, Support from family    Medical Problems:   History reviewed. No pertinent past medical history.    Medical Bed:  Does patient require a medical bed? no  If answered yes for medical bed use, does the patient have the following conditions?   High risk for falls? no   Obstructive sleep apnea? no   Oxygen Use? no   Use of assistive devices? no   Other, (explain)? NA    Metabolic 
his sleep was not great and that he only got 4 hours. Patient rated his depression as a 7 and his anxiety as a 5 and denied SI, HI, and AVH. Patient says his appetite is good but somewhat decreased and he has had a shower.         Electronically signed by Juan Cavrajal RN on 8/7/24 at 1:24 PM CDT

## 2024-08-10 NOTE — TELEPHONE ENCOUNTER
SW attempted to follow up with the pt after his discharge from the unit yesterday, and spoke with him. Pt said he was doing good so far, and denied having any questions or concerns at this time.

## 2024-08-12 LAB
BACTERIA BLD CULT ORG #2: NORMAL
BACTERIA BLD CULT: NORMAL